# Patient Record
Sex: FEMALE | Race: WHITE
[De-identification: names, ages, dates, MRNs, and addresses within clinical notes are randomized per-mention and may not be internally consistent; named-entity substitution may affect disease eponyms.]

---

## 2019-01-05 ENCOUNTER — HOSPITAL ENCOUNTER (INPATIENT)
Dept: HOSPITAL 75 - 4TH | Age: 74
LOS: 2 days | Discharge: NURSING, REHAB OR LONG-TERM CARE | DRG: 190 | End: 2019-01-07
Attending: FAMILY MEDICINE | Admitting: FAMILY MEDICINE
Payer: MEDICARE

## 2019-01-05 VITALS — DIASTOLIC BLOOD PRESSURE: 51 MMHG | SYSTOLIC BLOOD PRESSURE: 97 MMHG

## 2019-01-05 VITALS — BODY MASS INDEX: 27.13 KG/M2 | HEIGHT: 62 IN | WEIGHT: 147.44 LBS

## 2019-01-05 VITALS — DIASTOLIC BLOOD PRESSURE: 57 MMHG | SYSTOLIC BLOOD PRESSURE: 127 MMHG

## 2019-01-05 DIAGNOSIS — J44.0: Primary | ICD-10-CM

## 2019-01-05 DIAGNOSIS — R09.02: ICD-10-CM

## 2019-01-05 DIAGNOSIS — M19.91: ICD-10-CM

## 2019-01-05 DIAGNOSIS — E78.00: ICD-10-CM

## 2019-01-05 DIAGNOSIS — M34.9: ICD-10-CM

## 2019-01-05 DIAGNOSIS — F03.90: ICD-10-CM

## 2019-01-05 DIAGNOSIS — Z99.81: ICD-10-CM

## 2019-01-05 DIAGNOSIS — K59.09: ICD-10-CM

## 2019-01-05 DIAGNOSIS — J18.1: ICD-10-CM

## 2019-01-05 DIAGNOSIS — I10: ICD-10-CM

## 2019-01-05 DIAGNOSIS — N30.00: ICD-10-CM

## 2019-01-05 DIAGNOSIS — E87.6: ICD-10-CM

## 2019-01-05 PROCEDURE — 84100 ASSAY OF PHOSPHORUS: CPT

## 2019-01-05 PROCEDURE — 87040 BLOOD CULTURE FOR BACTERIA: CPT

## 2019-01-05 PROCEDURE — 83735 ASSAY OF MAGNESIUM: CPT

## 2019-01-05 PROCEDURE — 36415 COLL VENOUS BLD VENIPUNCTURE: CPT

## 2019-01-05 PROCEDURE — 83605 ASSAY OF LACTIC ACID: CPT

## 2019-01-05 PROCEDURE — 83880 ASSAY OF NATRIURETIC PEPTIDE: CPT

## 2019-01-05 PROCEDURE — 71046 X-RAY EXAM CHEST 2 VIEWS: CPT

## 2019-01-05 PROCEDURE — 80053 COMPREHEN METABOLIC PANEL: CPT

## 2019-01-05 PROCEDURE — 94640 AIRWAY INHALATION TREATMENT: CPT

## 2019-01-05 PROCEDURE — 85025 COMPLETE CBC W/AUTO DIFF WBC: CPT

## 2019-01-05 PROCEDURE — 94760 N-INVAS EAR/PLS OXIMETRY 1: CPT

## 2019-01-05 PROCEDURE — 94761 N-INVAS EAR/PLS OXIMETRY MLT: CPT

## 2019-01-05 RX ADMIN — IPRATROPIUM BROMIDE AND ALBUTEROL SULFATE SCH ML: .5; 3 SOLUTION RESPIRATORY (INHALATION) at 22:28

## 2019-01-05 NOTE — NUR
JULIET SEN admitted to room 407-1, with an admitting diagnosis of UTI, PNEUMONIA, on 
01/05/19 from [place]  via [facility], accompanied by EMS STAFF .JULIET SEN 
introduced to surroundings, call light, bed controls, phone, TV, temperature control, 
lights, meal times, smoking policy, visitor policy, side rail policy, bathrooms and showers. 
 Patient Rights given to patient in the handbook.JULIET SEN verbalizes understanding 
that [facility] is not responsible for the loss or damage to any personal effects or 
valuables that are kept in the patients posession during their hospitalization.

## 2019-01-06 VITALS — DIASTOLIC BLOOD PRESSURE: 57 MMHG | SYSTOLIC BLOOD PRESSURE: 112 MMHG

## 2019-01-06 VITALS — DIASTOLIC BLOOD PRESSURE: 57 MMHG | SYSTOLIC BLOOD PRESSURE: 102 MMHG

## 2019-01-06 VITALS — DIASTOLIC BLOOD PRESSURE: 52 MMHG | SYSTOLIC BLOOD PRESSURE: 105 MMHG

## 2019-01-06 VITALS — DIASTOLIC BLOOD PRESSURE: 59 MMHG | SYSTOLIC BLOOD PRESSURE: 114 MMHG

## 2019-01-06 VITALS — DIASTOLIC BLOOD PRESSURE: 56 MMHG | SYSTOLIC BLOOD PRESSURE: 116 MMHG

## 2019-01-06 LAB
ALBUMIN SERPL-MCNC: 3.6 GM/DL (ref 3.2–4.5)
ALP SERPL-CCNC: 156 U/L (ref 40–136)
ALT SERPL-CCNC: 8 U/L (ref 0–55)
BASOPHILS # BLD AUTO: 0 10^3/UL (ref 0–0.1)
BASOPHILS NFR BLD AUTO: 0 % (ref 0–10)
BILIRUB SERPL-MCNC: 0.6 MG/DL (ref 0.1–1)
BUN/CREAT SERPL: 13
CALCIUM SERPL-MCNC: 9.1 MG/DL (ref 8.5–10.1)
CHLORIDE SERPL-SCNC: 105 MMOL/L (ref 98–107)
CO2 SERPL-SCNC: 23 MMOL/L (ref 21–32)
CREAT SERPL-MCNC: 0.77 MG/DL (ref 0.6–1.3)
EOSINOPHIL # BLD AUTO: 0.2 10^3/UL (ref 0–0.3)
EOSINOPHIL NFR BLD AUTO: 3 % (ref 0–10)
ERYTHROCYTE [DISTWIDTH] IN BLOOD BY AUTOMATED COUNT: 13.6 % (ref 10–14.5)
GFR SERPLBLD BASED ON 1.73 SQ M-ARVRAT: > 60 ML/MIN
GLUCOSE SERPL-MCNC: 101 MG/DL (ref 70–105)
HCT VFR BLD CALC: 37 % (ref 35–52)
HGB BLD-MCNC: 11.5 G/DL (ref 11.5–16)
LYMPHOCYTES # BLD AUTO: 1.6 X 10^3 (ref 1–4)
LYMPHOCYTES NFR BLD AUTO: 30 % (ref 12–44)
MAGNESIUM SERPL-MCNC: 1.9 MG/DL (ref 1.8–2.4)
MANUAL DIFFERENTIAL PERFORMED BLD QL: NO
MCH RBC QN AUTO: 30 PG (ref 25–34)
MCHC RBC AUTO-ENTMCNC: 31 G/DL (ref 32–36)
MCV RBC AUTO: 97 FL (ref 80–99)
MONOCYTES # BLD AUTO: 0.7 X 10^3 (ref 0–1)
MONOCYTES NFR BLD AUTO: 14 % (ref 0–12)
NEUTROPHILS # BLD AUTO: 2.7 X 10^3 (ref 1.8–7.8)
NEUTROPHILS NFR BLD AUTO: 52 % (ref 42–75)
PHOSPHATE SERPL-MCNC: 3.9 MG/DL (ref 2.3–4.7)
PLATELET # BLD: 147 10^3/UL (ref 130–400)
PMV BLD AUTO: 10.5 FL (ref 7.4–10.4)
POTASSIUM SERPL-SCNC: 3.3 MMOL/L (ref 3.6–5)
PROT SERPL-MCNC: 6.6 GM/DL (ref 6.4–8.2)
RBC # BLD AUTO: 3.79 10^6/UL (ref 4.35–5.85)
SODIUM SERPL-SCNC: 140 MMOL/L (ref 135–145)
WBC # BLD AUTO: 5.3 10^3/UL (ref 4.3–11)

## 2019-01-06 RX ADMIN — IPRATROPIUM BROMIDE AND ALBUTEROL SULFATE SCH ML: .5; 3 SOLUTION RESPIRATORY (INHALATION) at 19:41

## 2019-01-06 RX ADMIN — SODIUM CHLORIDE SCH MLS/HR: 900 INJECTION INTRAVENOUS at 08:23

## 2019-01-06 RX ADMIN — IPRATROPIUM BROMIDE AND ALBUTEROL SULFATE SCH ML: .5; 3 SOLUTION RESPIRATORY (INHALATION) at 07:07

## 2019-01-06 RX ADMIN — POTASSIUM CHLORIDE SCH MEQ: 1500 TABLET, EXTENDED RELEASE ORAL at 15:54

## 2019-01-06 RX ADMIN — IPRATROPIUM BROMIDE AND ALBUTEROL SULFATE SCH ML: .5; 3 SOLUTION RESPIRATORY (INHALATION) at 10:16

## 2019-01-06 RX ADMIN — DOCUSATE SODIUM AND SENNOSIDES SCH EA: 8.6; 5 TABLET, FILM COATED ORAL at 21:48

## 2019-01-06 RX ADMIN — IPRATROPIUM BROMIDE AND ALBUTEROL SULFATE SCH ML: .5; 3 SOLUTION RESPIRATORY (INHALATION) at 22:04

## 2019-01-06 RX ADMIN — IPRATROPIUM BROMIDE AND ALBUTEROL SULFATE SCH ML: .5; 3 SOLUTION RESPIRATORY (INHALATION) at 02:25

## 2019-01-06 RX ADMIN — IPRATROPIUM BROMIDE AND ALBUTEROL SULFATE SCH ML: .5; 3 SOLUTION RESPIRATORY (INHALATION) at 14:18

## 2019-01-06 RX ADMIN — HYDROCODONE BITARTRATE AND ACETAMINOPHEN PRN TAB: 5; 325 TABLET ORAL at 15:54

## 2019-01-06 NOTE — DIAGNOSTIC IMAGING REPORT
INDICATION: Shortness of air, cough, pneumonia.



TECHNIQUE: Two-view chest at 10:55 a.m.



CORRELATION STUDY: None.



FINDINGS: Heart size is enlarged. Mediastinum is mildly

prominent, may be accentuated by technique and patient

positioning. There are scattered pulmonary parenchymal densities,

right greater than left, most severe involving the lung bases. 

Visualized osseous structures are unremarkable.



IMPRESSION: 

1. Scattered pulmonary parenchymal densities are present. Some of

this may be chronic, findings are suspect for likely more focal

infiltrates about the lung bases. Particularly given no priors

for comparison, short-term followup two-view chest imaging is

recommended for reassessment.

2. Cardiac enlargement without evidence for overt failure.



Dictated by: 



  Dictated on workstation # [id number]

## 2019-01-06 NOTE — HISTORY & PHYSICAL-HOSPITALIST
History of Present Illness


HPI/Chief Complaint


CC: Dyspnea





HPI: This is a 74-year-old white female nursing home patient at [facility] 

in [place] who is the sister of a current patient of mine that has history 

of severe COPD that presented to the [place] ER with hypoxia.  She was 

diagnosed with pneumonia placed on doxycycline 1 day prior worsened to the 

point that they brought her to the ER again and was found to be in need for IV 

antibiotics and transfer for higher level of care.  Dr. Burroughs is seen her in 

consultation and that is appreciated.  Her sister reports that she was on a 

ventilator about one year ago didn't think she would survive but has since been 

struggling with severe COPD.  Chest x-ray revealed bilateral infiltrates 

patient is been maintained on IV antibiotics and workup has been ordered by Dr. Burroughs.  Currently she reports she is less dyspneic since admission and overall 

feels like she is improving.  I reviewed and restarted most of her home 

medications except for immunosuppressants.  Patient was previously on hospice 

and was discharged from that service recently.


Source:  patient, family, caregiver


Exam Limitations:  no limitations


Date Seen


19


Time Seen by a Provider:  11:00


Attending Physician


Teresa Fallon MD


PCP


Jim Vargas MD


Referring Physician





Date of Admission


2019 at 19:20





Home Medications & Allergies


Home Medications


Reviewed patient Home Medication Reconciliation performed by pharmacy 

medication reconciliations technician and/or nursing.


Patients Allergies have been reviewed.





Allergies





Allergies


Coded Allergies


  Tetanus Vaccines and Toxoid (Verified Allergy, Unknown, 19)


  codeine (Verified Allergy, Unknown, 19)


  meperidine (Verified Allergy, Unknown, 19)


  rofecoxib (Verified Allergy, Unknown, 19)


Uncoded Allergies


  NSAIDS ( Allergy, Unknown, 19)








Past Medical-Social-Family Hx


Past Med/Social Hx:  Reviewed Nursing Past Med/Soc Hx, Reviewed and Corrections 

made


Patient Social History


Marrital Status:  single


Employed/Student:  retired


Alcohol Use:  Denies Use


Recreational Drug Use:  No


Smoking Status:  Never a Smoker


Physical Abuse Screen:  No


Sexual Abuse:  No


Recent Foreign Travel:  No


Contact w/other who traveled:  No


Recent Hopitalizations:  No


Recent Infectious Disease Expo:  No





Seasonal Allergies


Seasonal Allergies:  No





Past Medical History


Respiratory:  COPD, Emphysema, Pneumonia


Currently Using CPAP:  No


Currently Using BIPAP:  No


Cardiac:  High Cholesterol, Hypertension


Neurological:  Dementia


Genitourinary:  Bladder Infection


Gastrointestinal:  Chronic Constipation


Musculoskeletal:  Arthritis


Sclererderma


History of Blood Disorders:  No


Adverse Reaction to Blood Castaneda:  No





Review of Systems


Constitutional:  see HPI, malaise, weakness


EENTM:  no symptoms reported


Respiratory:  cough, dyspnea on exertion, short of breath, wheezing


Cardiovascular:  no symptoms reported


Gastrointestinal:  no symptoms reported


Genitourinary:  no symptoms reported


Musculoskeletal:  no symptoms reported


Skin:  no symptoms reported


Psychiatric/Neurological:  No Symptoms Reported


All Other Systems Reviewed


Negative Unless Noted:  Yes





Physical Exam


Physical Exam


Vital Signs





Vital Signs - First Documented








 19





 19:20 20:00 21:27


 


Temp 99.8  


 


Pulse 116  


 


Resp 18  


 


B/P (MAP) 127/57 (80)  


 


Pulse Ox 97  


 


O2 Delivery Room Air  


 


O2 Flow Rate  4.00 


 


FiO2   32





Capillary Refill :


Height, Weight, BMI


Height: 5'2.00"


Weight: 147lbs. 7.0oz. 66.685602pf; 27.0 BMI


Method:


General Appearance:  No Apparent Distress, WD/WN, Chronically ill, Thin


Eyes:  Bilateral Eye Normal Inspection, Bilateral Eye PERRL


HEENT:  PERRL/EOMI, Normal ENT Inspection, Pharynx Normal


Neck:  Full Range of Motion, Normal Inspection, Non Tender, Supple, Carotid 

Bruit


Respiratory:  Chest Non Tender, No Accessory Muscle Use, No Respiratory Distress

, Crackles, Decreased Breath Sounds, Wheezing


Cardiovascular:  Regular Rate, Rhythm, No Edema, No Gallop, No JVD, No Murmur, 

Normal Peripheral Pulses


Gastrointestinal:  Normal Bowel Sounds, No Organomegaly, No Pulsatile Mass, Non 

Tender, Soft


Back:  Normal Inspection, No CVA Tenderness, No Vertebral Tenderness


Extremity:  Normal Capillary Refill, Normal Inspection, Normal Range of Motion, 

Non Tender, No Calf Tenderness, No Pedal Edema


Neurologic/Psychiatric:  Alert, Oriented x3, No Motor/Sensory Deficits, Normal 

Mood/Affect


Skin:  Normal Color, Warm/Dry


Lymphatic:  No Adenopathy





Results


Results/Procedures


Labs


Laboratory Tests


19 05:33








Patient resulted labs reviewed.





Assessment/Plan


Admission Diagnosis


Assessment:


Pneumonia failed doxycycline as an outpatient


History of hospice enrollment just recently discontinued


Severe COPD needs 4 L of oxygen continuously


Scleroderma on immunosuppressives


Dementia


Frail status


History of vent dependency with trach





Plan:


Pneumonia treatment


Oxygen


Nebulizer treatments


Hold immunosuppressive


Restart all home meds


Recheck labs in a.m.


Admission Status:  Inpatient Order (span 2 midnights)


Reason for Inpatient Admission:  


Severe COPD with pneumonia will require 3 days of hospital stay





Diagnosis/Problems


Diagnosis/Problems





(1) Pneumonia


Status:  Acute


Qualifiers:  


   Pneumonia type:  due to unspecified organism  Laterality:  bilateral  Lung 

location:  lower lobe of lung  Qualified Codes:  J18.1 - Lobar pneumonia, 

unspecified organism


(2) UTI (urinary tract infection)


Status:  Acute


Qualifiers:  


   Urinary tract infection type:  acute cystitis  Hematuria presence:  without 

hematuria  Qualified Codes:  N30.00 - Acute cystitis without hematuria


(3) Hypokalemia


Status:  Acute


(4) Frailty


Status:  Chronic


(5) Scleroderma


Status:  Chronic


(6) Immunosuppressed status


Status:  Chronic





Clinical Quality Measures


DVT/VTE Risk/Contraindication:


Risk Factor Score Per Nursin


RFS Level Per Nursing on Admit:  2=Moderate











SRINIVAS SOLORIO DO 2019 12:21

## 2019-01-06 NOTE — NUR
Patient complained of a dull achy feeling on her right back side.  Patient also complained 
of feeling like her bladder is not emptying.  Bladder scan was done.  It showed 30 mls in 
the bladder.  Patient asked for pain medication.  Pain medication given.  Will continue to 
monitor patient.

## 2019-01-06 NOTE — NUR
home oxygen study



pt spo2 dropped while at rest on room air to 79% replaced nasal cannula at 4 lpm and spo2 
increased to 92% pt will require 4 lpm nasal cannula at all times

## 2019-01-06 NOTE — PULMONARY CONSULTATION
History of Present Illness


History of Present Illness


Date of Consultation


1/6/19


 06:16


Time Seen by Provider:  07:37


Date of Admission





History of Present Illness


73yo from ECF with hx of severe oxygen dependent COPD presented as direct admit 

from [place] ED secondary to worsening SOB and hypoxa. She was placed on 

doxy just 1 day prior to admission secondary to pneumonia. Pt required 

ventilator care just 1 year ago. PT has recently been on hospice however was 

dishcarged. I am consulted for pulmonary management.





Allergies and Home Medications


Allergies


Coded Allergies:  


     Tetanus Vaccines and Toxoid (Verified  Allergy, Unknown, 1/5/19)


     codeine (Verified  Allergy, Unknown, 1/5/19)


     meperidine (Verified  Allergy, Unknown, 1/5/19)


     rofecoxib (Verified  Allergy, Unknown, 1/5/19)


Uncoded Allergies:  


     NSAIDS (Allergy, Unknown, 1/5/19)





Home Medications


Acetaminophen 325 Mg Tablet, 325 MG PO Q6H PRN for PAIN-MILD, (Reported)


Albuterol Sulfate 2.5 Mg/0.5 Ml Vial.neb, 2.5 MG INH Q6H, (Reported)


Alprazolam 0.25 Mg Tablet, 0.25 MG PO BID, (Reported)


Amantadine HCl 100 Mg Capsule, 100 MG PO DAILY, (Reported)


Atorvastatin Calcium 10 Mg Tablet, 10 MG PO HS, (Reported)


Calcium Carbonate 200 Mg Tab.chew, 1,000 MG PO Q6H PRN for INDIGESTION, (

Reported)


Doxycycline Hyclate 100 Mg Capsule, 100 MG PO BID, (Reported)


Furosemide 40 Mg Tablet, 40 MG PO DAILY, (Reported)


Gabapentin 100 Mg Capsule, 200 MG PO HS, (Reported)


Hydrocodone/Acetaminophen 1 Each Tablet, 1 EACH PO Q4H PRN for PAIN-MILD TO 

MODERATE, (Reported)


Hydroxychloroquine Sulfate 200 Mg Tablet, 200 MG PO BID, (Reported)


Magnesium Oxide 200 Mg Tablet, 400 MG PO DAILY, (Reported)


Metoprolol Tartrate 25 Mg Tablet, 25 MG PO BID PRN for hypertension, (Reported)


Miconazole/Lanolin/Skin Cln 1 Each Combo..pkg, 1 EACH TP AS NEEDED, (Reported)


Mirtazapine 15 Mg Tablet, 15 MG PO HS PRN for depresson, (Reported)


Mycophenolate Mofetil 500 Mg Tablet, 1,000 MG PO BID, (Reported)


Ondansetron 4 Mg Tab.rapdis, 4 MG PO Q6H PRN for NAUSEA/VOMITING, (Reported)


Polyethylene Glycol 3350 17 Gm Powd.pack, 17 GM PO DAILY PRN for CONSTIPATION-

1ST LINE, (Reported)


Potassium Chloride 20 Meq Tablet.er, 20 MEQ PO BID, (Reported)


Ranitidine HCl 150 Mg Tablet, 150 MG PO BID, (Reported)


Sennosides/Docusate Sodium 1 Each Tablet, 1 EACH PO BID, (Reported)


Tramadol HCl 50 Mg Tablet, 100 MG PO Q6H PRN for PAIN-MILD TO MODERATE, (

Reported)


Trazodone HCl 50 Mg Tablet, 25 MG PO HS, (Reported)


[benylin]  , 30 MG PO Q6H PRN for COUGH, (Reported)


[multivitamin-zinc ]  , 1 TAB PO DAILY, (Reported)





Past Medical-Social-Family Hx


Patient Social History


Alcohol Use:  Denies Use


Recreational Drug Use:  No


Smoking Status:  Never a Smoker


Recent Foreign Travel:  No


Contact w/Someone Who Travel:  No


Recent Infectious Disease Expo:  No


Recent Hopitalizations:  No





Seasonal Allergies


Seasonal Allergies:  No





Past Medical History


Surgeries:  Yes


Respiratory:  Yes


COPD


Currently Using CPAP:  No


Currently Using BIPAP:  No


Cardiac:  No


Neurological:  No


Genitourinary:  No


Gastrointestinal:  No


Musculoskeletal:  No


Endocrine:  No


HEENT:  No


Cancer:  No


Psychosocial:  No


Blood Disorders:  No


Adverse Reaction/Blood Tranf:  No





Review of Systems


Time Seen by Provider:  07:39


Constitutional:  Fever, Chills, Sweats, Weakness


Eyes:  No: Pain, Vision change, Conjunctivae inflammation, Eyelid inflammation, 

Other, Redness


ENT:  Nose congestion; No: Ear pain, Ear discharge, Nose pain, Nose discharge, 

Mouth pain, Mouth swelling, Throat pain, Throat swelling, Other


Respiratory:  Cough, Dry, Shortness of breath, SOB with excertion, Wheezing; No

: Hemoptysis


Cardiovascular:  Palpitations, Paroxysmal Noc. Dyspnea, Lt Headedness; No: 

Chest Pain, Orthopnea, Edema, Other


Gastrointestinal:  No: Nausea, Vomiting, Abdominal Pain, Diarrhea, Constipation

, Melena, Hematochezia, Other


Genitourinary:  No Dysuria, No Frequency, No Incontinence, No Hematuria, No 

Retention, No Other





Sepsis Event


Evaluation


Height, Weight, BMI


Height: 5'2.00"


Weight: 147lbs. 7.0oz. [id number]; 27.0 BMI


Method:





Exam


Exam





Vital Signs








  Date Time  Temp Pulse Resp B/P (MAP) Pulse Ox O2 Delivery O2 Flow Rate FiO2


 


1/6/19 02:27     99 Nasal Cannula 4.00 


 


1/5/19 23:25 98.9 98 16 97/51 (66) 99 Room Air  


 


1/5/19 22:33     98 Nasal Cannula 4.00 


 


1/5/19 22:02      Room Air  


 


1/5/19 21:27  116   97   32


 


1/5/19 20:00     98 Nasal Cannula 4.00 


 


1/5/19 19:20 99.8 116 18 127/57 (80) 97 Room Air  














I & O 


 


 1/6/19





 07:00


 


Intake Total 400 ml


 


Balance 400 ml








Height & Weight


Height: 5'2.00"


Weight: 147lbs. 7.0oz. 66.997852ok; 27.0 BMI


Method:


General Appearance:  No Apparent Distress, WD/WN, Chronically ill


HEENT:  PERRL/EOMI, TMs Normal, Normal ENT Inspection, Pharynx Normal


Neck:  Full Range of Motion, Normal Inspection, Non Tender, Supple


Respiratory:  Chest Non Tender, No Accessory Muscle Use, No Respiratory Distress

, Crackles, Decreased Breath Sounds


Cardiovascular:  Regular Rate, Rhythm, No Edema, No Gallop


Capillary Refill:  Less Than 3 Seconds


Gastrointestinal:  normal bowel sounds, non tender, soft


Extremity:  Normal Capillary Refill, Normal Inspection


Neurologic/Psychiatric:  Alert, Oriented x3


Skin:  Normal Color, Warm/Dry


Lymphatic:  No Adenopathy





Assessment/Plan


Assessment/Plan


Pneumonia with hypoxia  -- TM 99.8 - failed out patient treatment 


   -Oxygen currently 4 liters NC


   -Currently on Rocpehin 


   -Labs and CXR pending 


   -pan cultures 


Severe oxygen dependent COPD


   -SVNs 


Scleroderma on immunosuppressives (on Hold currently) 


Dementia


History of vent dependency with trach


Hx of being on hospice











GORGE KEYES DO Jan 6, 2019 06:21

## 2019-01-07 VITALS — DIASTOLIC BLOOD PRESSURE: 53 MMHG | SYSTOLIC BLOOD PRESSURE: 95 MMHG

## 2019-01-07 VITALS — DIASTOLIC BLOOD PRESSURE: 55 MMHG | SYSTOLIC BLOOD PRESSURE: 103 MMHG

## 2019-01-07 VITALS — SYSTOLIC BLOOD PRESSURE: 112 MMHG | DIASTOLIC BLOOD PRESSURE: 61 MMHG

## 2019-01-07 VITALS — DIASTOLIC BLOOD PRESSURE: 57 MMHG | SYSTOLIC BLOOD PRESSURE: 118 MMHG

## 2019-01-07 LAB
ALBUMIN SERPL-MCNC: 3.5 GM/DL (ref 3.2–4.5)
ALP SERPL-CCNC: 146 U/L (ref 40–136)
ALT SERPL-CCNC: 6 U/L (ref 0–55)
BASOPHILS # BLD AUTO: 0 10^3/UL (ref 0–0.1)
BASOPHILS NFR BLD AUTO: 0 % (ref 0–10)
BILIRUB SERPL-MCNC: 0.5 MG/DL (ref 0.1–1)
BUN/CREAT SERPL: 14
CALCIUM SERPL-MCNC: 9 MG/DL (ref 8.5–10.1)
CHLORIDE SERPL-SCNC: 106 MMOL/L (ref 98–107)
CO2 SERPL-SCNC: 24 MMOL/L (ref 21–32)
CREAT SERPL-MCNC: 0.7 MG/DL (ref 0.6–1.3)
EOSINOPHIL # BLD AUTO: 0.2 10^3/UL (ref 0–0.3)
EOSINOPHIL NFR BLD AUTO: 4 % (ref 0–10)
ERYTHROCYTE [DISTWIDTH] IN BLOOD BY AUTOMATED COUNT: 13.5 % (ref 10–14.5)
GFR SERPLBLD BASED ON 1.73 SQ M-ARVRAT: > 60 ML/MIN
GLUCOSE SERPL-MCNC: 97 MG/DL (ref 70–105)
HCT VFR BLD CALC: 35 % (ref 35–52)
HGB BLD-MCNC: 10.8 G/DL (ref 11.5–16)
LYMPHOCYTES # BLD AUTO: 1.6 X 10^3 (ref 1–4)
LYMPHOCYTES NFR BLD AUTO: 31 % (ref 12–44)
MANUAL DIFFERENTIAL PERFORMED BLD QL: NO
MCH RBC QN AUTO: 30 PG (ref 25–34)
MCHC RBC AUTO-ENTMCNC: 31 G/DL (ref 32–36)
MCV RBC AUTO: 97 FL (ref 80–99)
MONOCYTES # BLD AUTO: 0.6 X 10^3 (ref 0–1)
MONOCYTES NFR BLD AUTO: 12 % (ref 0–12)
NEUTROPHILS # BLD AUTO: 2.6 X 10^3 (ref 1.8–7.8)
NEUTROPHILS NFR BLD AUTO: 53 % (ref 42–75)
PLATELET # BLD: 159 10^3/UL (ref 130–400)
PMV BLD AUTO: 10.4 FL (ref 7.4–10.4)
POTASSIUM SERPL-SCNC: 4 MMOL/L (ref 3.6–5)
PROT SERPL-MCNC: 6.9 GM/DL (ref 6.4–8.2)
RBC # BLD AUTO: 3.63 10^6/UL (ref 4.35–5.85)
SODIUM SERPL-SCNC: 139 MMOL/L (ref 135–145)
WBC # BLD AUTO: 5 10^3/UL (ref 4.3–11)

## 2019-01-07 RX ADMIN — HYDROCODONE BITARTRATE AND ACETAMINOPHEN PRN TAB: 5; 325 TABLET ORAL at 08:22

## 2019-01-07 RX ADMIN — IPRATROPIUM BROMIDE AND ALBUTEROL SULFATE SCH ML: .5; 3 SOLUTION RESPIRATORY (INHALATION) at 06:36

## 2019-01-07 RX ADMIN — SODIUM CHLORIDE SCH MLS/HR: 900 INJECTION INTRAVENOUS at 08:03

## 2019-01-07 RX ADMIN — HYDROCODONE BITARTRATE AND ACETAMINOPHEN PRN TAB: 5; 325 TABLET ORAL at 01:47

## 2019-01-07 RX ADMIN — IPRATROPIUM BROMIDE AND ALBUTEROL SULFATE SCH ML: .5; 3 SOLUTION RESPIRATORY (INHALATION) at 10:42

## 2019-01-07 RX ADMIN — IPRATROPIUM BROMIDE AND ALBUTEROL SULFATE SCH ML: .5; 3 SOLUTION RESPIRATORY (INHALATION) at 02:35

## 2019-01-07 RX ADMIN — DOCUSATE SODIUM AND SENNOSIDES SCH EA: 8.6; 5 TABLET, FILM COATED ORAL at 08:04

## 2019-01-07 RX ADMIN — POTASSIUM CHLORIDE SCH MEQ: 1500 TABLET, EXTENDED RELEASE ORAL at 07:42

## 2019-01-07 NOTE — DIAGNOSTIC IMAGING REPORT
INDICATION: Pneumonia.



PA and lateral chest obtained at 10:27 a.m. is compared to

yesterday.



FINDINGS: There is very poor inspiration again noted. Bilateral

infiltrates appears stable and unchanged compared to the prior

study. There is cardiomegaly. There is no pneumothorax or gross

pleural fluid.



IMPRESSION:



Cardiomegaly with very poor inspiration. There is central

vascular congestion with unchanged bilateral infiltrates compared

to yesterday.



Dictated by: 



  Dictated on workstation # [id number]

## 2019-01-07 NOTE — NUR
CM/SS spoke with Anisha Chowdhury ([facility]) they received the discharge 
information that was faxed. They will send transportation this way for  within the 
hour. RNing and patient updated.

## 2019-01-07 NOTE — NUR
UNABLE TO UPDATE MED REC PRIOR TO DISCHARGE BEING FINALIZED. PUT THE LIST OF MEDICATIONS 
FROM [facility] ON THE CHART AT THIS TIME.

## 2019-01-07 NOTE — NUR
CM/SS Sameer Suazo [facility] were notified that the patient was ready for return to their 
facility this day. Left a message with Anisha Chowdhury for a transport time.

## 2019-01-07 NOTE — PULMONARY PROGRESS NOTE
Subjective


Time Seen by a Provider:  07:42





Sepsis Event


Evaluation


Height, Weight, BMI


Height: 5'2.00"


Weight: 147lbs. 7.0oz. 66.138099ok; 27.0 BMI


Method:





Focused Exam


Lactate Level


1/6/19 07:05: Lactic Acid Level 1.01





Exam


Exam





Vital Signs








  Date Time  Temp Pulse Resp B/P (MAP) Pulse Ox O2 Delivery O2 Flow Rate FiO2


 


1/7/19 06:38     98 Nasal Cannula 3.00 


 


1/7/19 03:44 97.5 88 16 103/55 (71) 99 Nasal Cannula 4.00 


 


1/7/19 02:35     98 Nasal Cannula 3.00 


 


1/7/19 00:09 97.7 96 16 112/61 (78) 98 Nasal Cannula 4.00 


 


1/6/19 22:04     98 Nasal Cannula 3.00 


 


1/6/19 20:00     99 Nasal Cannula 4.00 


 


1/6/19 19:49 97.7 91 18 102/57 (72) 100 Nasal Cannula 3.00 


 


1/6/19 19:41     98 Nasal Cannula 4.00 


 


1/6/19 15:32 98.0 107 18 116/56 (76) 100 Nasal Cannula 4.00 


 


1/6/19 14:19     95 Nasal Cannula 4.00 


 


1/6/19 13:16     92  4.00 


 


1/6/19 12:00 98.2 100 22 105/52 (69) 100 Nasal Cannula 4.00 


 


1/6/19 10:16     95 Nasal Cannula 4.00 


 


1/6/19 08:00 99.3 105 28 114/59 (77) 99 Nasal Cannula 4.00 


 


1/6/19 08:00     99 Nasal Cannula 4.00 














I & O 


 


 1/7/19





 07:00


 


Intake Total 1550 ml


 


Balance 1550 ml








Height & Weight


Height: 5'2.00"


Weight: 147lbs. 7.0oz. 66.719095je; 27.0 BMI


Method:


General Appearance:  No Apparent Distress, WD/WN, Chronically ill


HEENT:  PERRL/EOMI, TMs Normal, Normal ENT Inspection, Pharynx Normal


Neck:  Full Range of Motion, Normal Inspection, Non Tender, Supple


Respiratory:  Chest Non Tender, No Accessory Muscle Use, No Respiratory Distress

, Crackles, Decreased Breath Sounds


Cardiovascular:  Regular Rate, Rhythm, No Edema, No Gallop


Capillary Refill:  Less Than 3 Seconds


Gastrointestinal:  normal bowel sounds, non tender, soft


Extremity:  Normal Capillary Refill, Normal Inspection


Neurologic/Psychiatric:  Alert, Oriented x3


Skin:  Normal Color, Warm/Dry


Lymphatic:  No Adenopathy





Results


Lab


Laboratory Tests


1/6/19 05:33








1/7/19 06:00











Assessment/Plan


Assessment/Plan


Pneumonia with hypoxia  --  failed out patient treatment 


   -No fever or leukocytosis


   -Oxygen currently 4 liters NC


   -Currently on Rocephin 


   -Labs and CXR reviewed 


   -pan cultures pending 


Severe oxygen dependent COPD


   -SVNs 


Scleroderma on immunosuppressives (on Hold currently) 


Dementia


History of vent dependency with trach


Hx of being on hospice











GORGE KEYES DO Jan 7, 2019 07:42

## 2019-02-22 ENCOUNTER — HOSPITAL ENCOUNTER (EMERGENCY)
Dept: HOSPITAL 75 - ER FS | Age: 74
Discharge: HOME | End: 2019-02-22
Payer: MEDICARE

## 2019-02-22 VITALS — HEIGHT: 63 IN | BODY MASS INDEX: 26.07 KG/M2 | WEIGHT: 147.13 LBS

## 2019-02-22 VITALS — SYSTOLIC BLOOD PRESSURE: 119 MMHG | DIASTOLIC BLOOD PRESSURE: 59 MMHG

## 2019-02-22 DIAGNOSIS — J84.10: Primary | ICD-10-CM

## 2019-02-22 DIAGNOSIS — Z99.81: ICD-10-CM

## 2019-02-22 DIAGNOSIS — Z88.8: ICD-10-CM

## 2019-02-22 DIAGNOSIS — Z79.51: ICD-10-CM

## 2019-02-22 DIAGNOSIS — Z88.6: ICD-10-CM

## 2019-02-22 DIAGNOSIS — Z87.448: ICD-10-CM

## 2019-02-22 DIAGNOSIS — Z88.7: ICD-10-CM

## 2019-02-22 DIAGNOSIS — E78.00: ICD-10-CM

## 2019-02-22 DIAGNOSIS — F03.90: ICD-10-CM

## 2019-02-22 DIAGNOSIS — Z87.19: ICD-10-CM

## 2019-02-22 DIAGNOSIS — Z79.52: ICD-10-CM

## 2019-02-22 DIAGNOSIS — J44.9: ICD-10-CM

## 2019-02-22 DIAGNOSIS — Z88.5: ICD-10-CM

## 2019-02-22 DIAGNOSIS — I10: ICD-10-CM

## 2019-02-22 PROCEDURE — 71045 X-RAY EXAM CHEST 1 VIEW: CPT

## 2019-02-22 PROCEDURE — 93005 ELECTROCARDIOGRAM TRACING: CPT

## 2019-02-22 NOTE — DIAGNOSTIC IMAGING REPORT
INDICATION: Shortness breath



Portable chest 3:33 AM



There are diffuse interstitial infiltrates in the lungs. These

appear similar to the comparison study dated 01/07/2019. There is

no effusion or pneumothorax. Heart size and pulmonary vascularity

both mildly increased but stable.



IMPRESSION: Chronic pulmonary venous hypertension. Diffuse

interstitial fibrosis. No change since 01/07/2019.



Dictated by: 



  Dictated on workstation # [id number]

## 2019-02-22 NOTE — ED RESPIRATORY
General


Chief Complaint:  Respiratory Problems


Stated Complaint:  SOB





History of Present Illness


Date Seen by Provider:  Feb 22, 2019


Time Seen by Provider:  03:20


Initial Comments


The patient is a pleasant 74-year-old female from nursing home here for 

evaluation shortness of breath. She tells me that she was feeling short of 

breath and was given 2 breathing treatments at the nursing home which 

completely resolved her symptoms. She then tells us that she did not want to be 

transported to the emergency department but was told that she was being 

transported anyway. She reports a history of pulmonary fibrosis and is on 

oxygen continuously. She is alert and oriented 3, calm, and appears to be in 

no distress. She does not want us to start an IV or do any blood testing but is 

okay with an EKG and a chest x-ray.


Timing/Duration:  this morning


Severity:  mild


Prior Episodes/Possible Cause:  chronic episodes


Modifying Factors:  Improves With Albuterol Nebulizer


Associated Symptoms:  shortness of breath





Allergies and Home Medications


Allergies


Coded Allergies:  


     Tetanus Vaccines and Toxoid (Verified  Allergy, Unknown, 2/22/19)


     codeine (Verified  Allergy, Unknown, 2/22/19)


     meperidine (Verified  Allergy, Unknown, 2/22/19)


     rofecoxib (Verified  Allergy, Unknown, 2/22/19)


Uncoded Allergies:  


     NSAIDS (Allergy, Unknown, 1/5/19)





Home Medications


Acetaminophen 325 Mg Tablet, 325 MG PO Q6H PRN for PAIN-MILD, (Reported)


Albuterol Sulfate 2.5 Mg/0.5 Ml Vial.neb, 2.5 MG INH Q6H, (Reported)


Alprazolam 0.25 Mg Tablet, 0.25 MG PO BID, (Reported)


Amantadine HCl 100 Mg Capsule, 100 MG PO DAILY, (Reported)


Atorvastatin Calcium 10 Mg Tablet, 10 MG PO HS, (Reported)


Calcium Carbonate 200 Mg Tab.chew, 1,000 MG PO Q6H PRN for INDIGESTION, (

Reported)


Cefdinir 300 Mg Capsule, 300 MG PO BID


   Prescribed by: SRINIVAS SOLORIO on 1/7/19 0940


Furosemide 40 Mg Tablet, 40 MG PO DAILY, (Reported)


Gabapentin 100 Mg Capsule, 200 MG PO HS, (Reported)


Hydrocodone/Acetaminophen 1 Each Tablet, 1 EACH PO Q4H PRN for PAIN-MILD TO 

MODERATE, (Reported)


Magnesium Oxide 200 Mg Tablet, 400 MG PO DAILY, (Reported)


Metoprolol Tartrate 25 Mg Tablet, 25 MG PO BID PRN for hypertension, (Reported)


Miconazole/Lanolin/Skin Cln 1 Each Combo..pkg, 1 EACH TP AS NEEDED, (Reported)


Mirtazapine 15 Mg Tablet, 15 MG PO HS PRN for depresson, (Reported)


Mycophenolate Mofetil 500 Mg Tablet, 1,000 MG PO BID, (Reported)


Ondansetron 4 Mg Tab.rapdis, 4 MG PO Q6H PRN for NAUSEA/VOMITING, (Reported)


Polyethylene Glycol 3350 17 Gm Powd.pack, 17 GM PO DAILY PRN for CONSTIPATION-

1ST LINE, (Reported)


Potassium Chloride 20 Meq Tablet.er, 20 MEQ PO BID, (Reported)


Prednisone 20 Mg Tab, 40 MG PO DAILY


   Prescribed by: EL ARROYO on 2/22/19 0342


Ranitidine HCl 150 Mg Tablet, 150 MG PO BID, (Reported)


Sennosides/Docusate Sodium 1 Each Tablet, 1 EACH PO BID, (Reported)


Tramadol HCl 50 Mg Tablet, 100 MG PO Q6H PRN for PAIN-MILD TO MODERATE, (

Reported)


Trazodone HCl 50 Mg Tablet, 25 MG PO HS, (Reported)


[benylin]  , 30 MG PO Q6H PRN for COUGH, (Reported)


[multivitamin-zinc ]  , 1 TAB PO DAILY, (Reported)





Patient Home Medication List


Home Medication List Reviewed:  Yes





Review of Systems


Review of Systems


Constitutional:  no symptoms reported


EENTM:  no symptoms reported


Respiratory:  short of breath


Cardiovascular:  no symptoms reported


Gastrointestinal:  no symptoms reported


Genitourinary:  no symptoms reported


Musculoskeletal:  no symptoms reported


Skin:  no symptoms reported


Psychiatric/Neurological:  No Symptoms Reported


Hematologic/Lymphatic:  No Symptoms Reported


Immunological/Allergic:  no symptoms reported





All Other Systems Reviewed


Negative Unless Noted:  Yes





Past Medical-Social-Family Hx


Patient Social History


Recent Hopitalizations:  No





Immunizations Up To Date


Date of Pneumonia Vaccine:  Oct 15, 2018


Date of Influenza Vaccine:  Oct 15, 2018





Seasonal Allergies


Seasonal Allergies:  No





Past Medical History


Surgeries:  Yes


Respiratory:  Yes


COPD


Currently Using CPAP:  No


Currently Using BIPAP:  No


Cardiac:  No


High Cholesterol, Hypertension


Neurological:  No


Dementia


Genitourinary:  No


Bladder Infection


Gastrointestinal:  No


Chronic Constipation


Musculoskeletal:  No


Arthritis


Endocrine:  No


HEENT:  No


Cancer:  No


Psychosocial:  No


Blood Disorders:  No


Adverse Reaction/Blood Tranf:  No





Physical Exam


Capillary Refill :


Height: 5'2.00"


Weight: 147lbs. 7.0oz. [id number]; 27.0 BMI


Method:


General Appearance:  WD/WN, no apparent distress


HEENT:  PERRL/EOMI, normal ENT inspection, pharynx normal


Neck:  non-tender, full range of motion, supple, normal inspection


Respiratory:  chest non-tender, lungs clear, no accessory muscle use; No 

respiratory distress; decreased breath sounds; No crackles, No rales, No rhonchi

, No stridor, No wheezing


Cardiovascular:  regular rate, rhythm; No no edema (1+ b/l edema (pt states 

chronic and at baseline)); no JVD, no murmur, tachycardia (Pt received 

nebulized breathing tx shortly before arrival)


Gastrointestinal:  normal bowel sounds, non tender, soft


Extremities:  normal range of motion, non-tender, normal inspection, no pedal 

edema, no calf tenderness


Neurologic/Psychiatric:  no motor/sensory deficits, alert, normal mood/affect, 

oriented x 3


Skin:  normal color, warm/dry





Progress/Results/Core Measures


Suspected Sepsis


SIRS


Temperature: 


Pulse:  


Respiratory Rate: 


 


Blood Pressure  / 


Mean:





Results/Orders


My Orders





Orders - ISMAEL GALLEGOS DO


Ekg Tracing (2/22/19 03:29)


Chest 1 View Ap/Pa Only (2/22/19 03:29)


Prednisone Tablet (Deltasone Tablet) (2/22/19 03:30)





Medications Given in ED





Current Medications








 Medications  Dose


 Ordered  Sig/María


 Route  Start Time


 Stop Time Status Last Admin


Dose Admin


 


 Prednisone  50 mg  ONCE  ONCE


 PO  2/22/19 03:30


 2/22/19 03:31 DC 2/22/19 03:48


50 MG








Vital Signs/I&O


Capillary Refill :


Progress Note :  


   Time:  03:59


Progress Note


@0359 - The patient's EKG shows sinus tachycardia which is expected given the 

recent breathing treatment. Otherwise are no concerning findings noted. Her 

chest x-ray shows chronic bilateral pulmonary fibrotic changes as expected. 

When compared to previous chest x-ray on 1/6/19 and 1/7/19 there is slight 

improvement in chronic appearing bilateral infiltrates. I explained the patient 

that we could do some blood testing to further evaluate her symptoms however 

she declined stating that she wants to go back to the nursing home and is 

feeling fine and feels that she is breathing normally. She will go home( to her 

nursing home) with a prescription for prednisone. She has been given some 

prednisone here in the emergency department. Advised the patient to follow up 

with her primary care physician in the next 1-2 days. I advised the patient to 

return to the emergency department immediately for any new or worsening 

symptoms. She expresses verbal understanding and is stable for discharge at 

this time.





ECG


Initial ECG Impression Date:  Feb 22, 2019


Initial ECG Impression Time:  03:33


Initial ECG Rate:  116


Initial ECG Rhythm:  S.Tach


Initial ECG Intervals:  Normal


Initial ECG Impression:  Nonspecific Changes


Comment


Sinus tachycardia, rate of 116, normal axis, no acute ischemic findings noted, 

no STEMI, reviewed and interpreted by myself





Diagnostic Imaging





   Diagonstic Imaging:  Xray


   Plain Films/CT/US/NM/MRI:  chest


Comments


Preliminary CXR - bilateral fibrotic changes/chronic-appearing infiltrates 

which appear slightly improved from chest x-ray 6 weeks ago, no consolidating 

infiltrate or mass is noted





Departure


Impression





 Primary Impression:  


 Dyspnea


 Additional Impression:  


 Chronic fibrosis of lung


Disposition:  01 HOME, SELF-CARE (back to nursing home)


Condition:  Stable





Departure-Patient Inst.


Referrals:  


DEMETRIUS CABRALES MD (PCP/Family)


Primary Care Physician


Patient Instructions:  Shortness of Breath (Dyspnea)





Add. Discharge Instructions:  


All discharge instructions reviewed with patient and/or family. Voiced 

understanding. Typically prescribed steroids as directed.


Scripts


Prednisone (Prednisone) 20 Mg Tab


40 MG PO DAILY for 5 Days, #10 TAB 0 Refills


   Prov: ISMAEL GALLEGOS DO         2/22/19











ISMAEL GALLEGOS DO Feb 22, 2019 03:36

## 2019-04-08 ENCOUNTER — HOSPITAL ENCOUNTER (OUTPATIENT)
Dept: HOSPITAL 75 - LAB FS | Age: 74
End: 2019-04-08
Attending: FAMILY MEDICINE
Payer: MEDICARE

## 2019-04-08 DIAGNOSIS — R82.998: Primary | ICD-10-CM

## 2019-04-08 LAB
APTT PPP: YELLOW S
BACTERIA #/AREA URNS HPF: (no result) /HPF
BILIRUB UR QL STRIP: NEGATIVE
FIBRINOGEN PPP-MCNC: (no result) MG/DL
GLUCOSE UR STRIP-MCNC: NEGATIVE MG/DL
KETONES UR QL STRIP: NEGATIVE
LEUKOCYTE ESTERASE UR QL STRIP: (no result)
NITRITE UR QL STRIP: POSITIVE
PH UR STRIP: 6.5 [PH] (ref 5–9)
PROT UR QL STRIP: (no result)
RBC #/AREA URNS HPF: (no result) /HPF
SP GR UR STRIP: 1.02 (ref 1.02–1.02)
UROBILINOGEN UR-MCNC: 0.2 MG/DL
WBC #/AREA URNS HPF: >100 /HPF

## 2019-04-08 PROCEDURE — 87077 CULTURE AEROBIC IDENTIFY: CPT

## 2019-04-08 PROCEDURE — 81000 URINALYSIS NONAUTO W/SCOPE: CPT

## 2019-04-08 PROCEDURE — 87088 URINE BACTERIA CULTURE: CPT

## 2019-06-05 ENCOUNTER — HOSPITAL ENCOUNTER (OUTPATIENT)
Dept: HOSPITAL 75 - RAD FS | Age: 74
End: 2019-06-05
Attending: FAMILY MEDICINE
Payer: MEDICARE

## 2019-06-05 DIAGNOSIS — R10.84: Primary | ICD-10-CM

## 2019-06-05 DIAGNOSIS — Z95.828: ICD-10-CM

## 2019-06-05 PROCEDURE — 74019 RADEX ABDOMEN 2 VIEWS: CPT

## 2019-06-05 NOTE — DIAGNOSTIC IMAGING REPORT
INDICATION: 

Abdominal pain.



TIME OF EXAMINATION:   

8:35 AM.



FINDINGS:

An IVC filter is in place. The bowel gas pattern is unremarkable

and appears nonobstructed. There are surgical clips in the right

upper quadrant. No pathologic calcifications are seen. No free

air is identified.



IMPRESSION: 

No acute feature is detected.



Dictated by: 



  Dictated on workstation # [id number]

## 2019-07-10 ENCOUNTER — HOSPITAL ENCOUNTER (EMERGENCY)
Dept: HOSPITAL 75 - ER FS | Age: 74
Discharge: HOME | End: 2019-07-10
Payer: MEDICARE

## 2019-07-10 VITALS — DIASTOLIC BLOOD PRESSURE: 65 MMHG | SYSTOLIC BLOOD PRESSURE: 119 MMHG

## 2019-07-10 VITALS — SYSTOLIC BLOOD PRESSURE: 120 MMHG | DIASTOLIC BLOOD PRESSURE: 63 MMHG

## 2019-07-10 VITALS — WEIGHT: 145 LBS | BODY MASS INDEX: 26.68 KG/M2 | HEIGHT: 62 IN

## 2019-07-10 DIAGNOSIS — J96.10: Primary | ICD-10-CM

## 2019-07-10 DIAGNOSIS — I10: ICD-10-CM

## 2019-07-10 DIAGNOSIS — J44.9: ICD-10-CM

## 2019-07-10 DIAGNOSIS — F03.90: ICD-10-CM

## 2019-07-10 DIAGNOSIS — J84.9: ICD-10-CM

## 2019-07-10 DIAGNOSIS — I25.2: ICD-10-CM

## 2019-07-10 DIAGNOSIS — Z99.81: ICD-10-CM

## 2019-07-10 DIAGNOSIS — Z88.5: ICD-10-CM

## 2019-07-10 DIAGNOSIS — Z88.7: ICD-10-CM

## 2019-07-10 DIAGNOSIS — Z88.6: ICD-10-CM

## 2019-07-10 DIAGNOSIS — E11.9: ICD-10-CM

## 2019-07-10 DIAGNOSIS — Z88.8: ICD-10-CM

## 2019-07-10 LAB
ALBUMIN SERPL-MCNC: 3.8 GM/DL (ref 3.2–4.5)
ALP SERPL-CCNC: 181 U/L (ref 40–136)
ALT SERPL-CCNC: 6 U/L (ref 0–55)
APTT BLD: 29 SEC (ref 24–35)
APTT PPP: (no result) S
ARTERIAL PATENCY WRIST A: (no result)
BACTERIA #/AREA URNS HPF: (no result) /HPF
BASE EXCESS STD BLDA CALC-SCNC: 6.7 MMOL/L (ref -2.5–2.5)
BASOPHILS # BLD AUTO: 0 10^3/UL (ref 0–0.1)
BASOPHILS NFR BLD AUTO: 1 % (ref 0–10)
BDY SITE: (no result)
BILIRUB SERPL-MCNC: 0.3 MG/DL (ref 0.1–1)
BILIRUB UR QL STRIP: NEGATIVE
BODY TEMPERATURE: 98.2
BUN/CREAT SERPL: 11
CALCIUM SERPL-MCNC: 8.6 MG/DL (ref 8.5–10.1)
CHLORIDE SERPL-SCNC: 101 MMOL/L (ref 98–107)
CO2 BLDA CALC-SCNC: 33.3 MMOL/L (ref 21–31)
CO2 SERPL-SCNC: 28 MMOL/L (ref 21–32)
CREAT SERPL-MCNC: 0.65 MG/DL (ref 0.6–1.3)
EOSINOPHIL # BLD AUTO: 0.2 10^3/UL (ref 0–0.3)
EOSINOPHIL NFR BLD AUTO: 4 % (ref 0–10)
ERYTHROCYTE [DISTWIDTH] IN BLOOD BY AUTOMATED COUNT: 13 % (ref 10–14.5)
FIBRINOGEN PPP-MCNC: CLEAR MG/DL
GFR SERPLBLD BASED ON 1.73 SQ M-ARVRAT: > 60 ML/MIN
GLUCOSE SERPL-MCNC: 124 MG/DL (ref 70–105)
GLUCOSE UR STRIP-MCNC: NEGATIVE MG/DL
HCT VFR BLD CALC: 37 % (ref 35–52)
HGB BLD-MCNC: 11.7 G/DL (ref 11.5–16)
INHALED O2 FLOW RATE: (no result) L/MIN
INR PPP: 1.2 (ref 0.8–1.4)
KETONES UR QL STRIP: NEGATIVE
LEUKOCYTE ESTERASE UR QL STRIP: (no result)
LYMPHOCYTES # BLD AUTO: 1.6 X 10^3 (ref 1–4)
LYMPHOCYTES NFR BLD AUTO: 26 % (ref 12–44)
MANUAL DIFFERENTIAL PERFORMED BLD QL: NO
MCH RBC QN AUTO: 31 PG (ref 25–34)
MCHC RBC AUTO-ENTMCNC: 31 G/DL (ref 32–36)
MCV RBC AUTO: 97 FL (ref 80–99)
MONOCYTES # BLD AUTO: 0.5 X 10^3 (ref 0–1)
MONOCYTES NFR BLD AUTO: 8 % (ref 0–12)
NEUTROPHILS # BLD AUTO: 3.6 X 10^3 (ref 1.8–7.8)
NEUTROPHILS NFR BLD AUTO: 61 % (ref 42–75)
NITRITE UR QL STRIP: NEGATIVE
PCO2 BLDA: 47 MMHG (ref 35–45)
PH BLDA: 7.44 [PH] (ref 7.37–7.43)
PH UR STRIP: 6.5 [PH] (ref 5–9)
PLATELET # BLD: 161 10^3/UL (ref 130–400)
PMV BLD AUTO: 10.8 FL (ref 7.4–10.4)
PO2 BLDA: 127 MMHG (ref 79–93)
POTASSIUM SERPL-SCNC: 3.1 MMOL/L (ref 3.6–5)
PROT SERPL-MCNC: 7.1 GM/DL (ref 6.4–8.2)
PROT UR QL STRIP: NEGATIVE
PROTHROMBIN TIME: 15.2 SEC (ref 12.2–14.7)
RBC #/AREA URNS HPF: (no result) /HPF
SAO2 % BLDA FROM PO2: 99 % (ref 94–100)
SODIUM SERPL-SCNC: 143 MMOL/L (ref 135–145)
SP GR UR STRIP: <=1.005 (ref 1.02–1.02)
SQUAMOUS #/AREA URNS HPF: (no result) /HPF
UROBILINOGEN UR-MCNC: 0.2 MG/DL
VENTILATION MODE VENT: NO
WBC # BLD AUTO: 5.9 10^3/UL (ref 4.3–11)

## 2019-07-10 PROCEDURE — 96361 HYDRATE IV INFUSION ADD-ON: CPT

## 2019-07-10 PROCEDURE — 36415 COLL VENOUS BLD VENIPUNCTURE: CPT

## 2019-07-10 PROCEDURE — 71045 X-RAY EXAM CHEST 1 VIEW: CPT

## 2019-07-10 PROCEDURE — 83605 ASSAY OF LACTIC ACID: CPT

## 2019-07-10 PROCEDURE — 93005 ELECTROCARDIOGRAM TRACING: CPT

## 2019-07-10 PROCEDURE — 85730 THROMBOPLASTIN TIME PARTIAL: CPT

## 2019-07-10 PROCEDURE — 87077 CULTURE AEROBIC IDENTIFY: CPT

## 2019-07-10 PROCEDURE — 85025 COMPLETE CBC W/AUTO DIFF WBC: CPT

## 2019-07-10 PROCEDURE — 87186 SC STD MICRODIL/AGAR DIL: CPT

## 2019-07-10 PROCEDURE — 87040 BLOOD CULTURE FOR BACTERIA: CPT

## 2019-07-10 PROCEDURE — 85610 PROTHROMBIN TIME: CPT

## 2019-07-10 PROCEDURE — 96374 THER/PROPH/DIAG INJ IV PUSH: CPT

## 2019-07-10 PROCEDURE — 80053 COMPREHEN METABOLIC PANEL: CPT

## 2019-07-10 PROCEDURE — 96375 TX/PRO/DX INJ NEW DRUG ADDON: CPT

## 2019-07-10 PROCEDURE — 83880 ASSAY OF NATRIURETIC PEPTIDE: CPT

## 2019-07-10 PROCEDURE — 87088 URINE BACTERIA CULTURE: CPT

## 2019-07-10 PROCEDURE — 82805 BLOOD GASES W/O2 SATURATION: CPT

## 2019-07-10 PROCEDURE — 81000 URINALYSIS NONAUTO W/SCOPE: CPT

## 2019-07-10 NOTE — ED RESPIRATORY
General


Chief Complaint:  Respiratory Problems


Stated Complaint:  SOB


Source:  patient, EMS, nursing home records


Exam Limitations:  physical impairment (hard of hearing)


 (JAMES JOINER)





History of Present Illness


Date Seen by Provider:  Jul 10, 2019


Time Seen by Provider:  05:03


Initial Comments


The patient presents to the ER by EMS from the nursing home with chief complaint

of a fever, cough, mild shortness of breath. She was on 4 L baseline nasal can

nula when EMS arrived. She did not have a fever for EMS with a MAXIMUM 

TEMPERATURE over 100 per nursing home staff. No mention of antipyretics. She 

was having a wheeze so EMS gave her a DuoNeb on route which helped. Nursing home

staff reported they oxygen saturation in the 60s but the patient was alert and 

talking and EMS reports the oxygen probe was on the fingernail with some finger 

now paint on it. When they put their probe on a different finger they got in the

mid to low 90s. Patient did have subjective complaint of shortness of breath and

99.4 temperature.


No mention of COPD or asthma. The patient has scleroderma and diabetes on oxygen

with DuoNeb but no anticholinergics her inhaled corticosteroids. She is on 

CellCept.


 (JAMES JOINER)





Allergies and Home Medications


Allergies


Coded Allergies:  


     Tetanus Vaccines and Toxoid (Verified  Allergy, Unknown, 2/22/19)


     codeine (Verified  Allergy, Unknown, 2/22/19)


     meperidine (Verified  Allergy, Unknown, 2/22/19)


     rofecoxib (Verified  Allergy, Unknown, 2/22/19)


Uncoded Allergies:  


     NSAIDS (Allergy, Unknown, 1/5/19)





Home Medications


Acetaminophen 325 Mg Tablet, 325 MG PO Q6H PRN for PAIN-MILD, (Reported)


Albuterol Sulfate 2.5 Mg/0.5 Ml Vial.neb, 2.5 MG INH Q6H, (Reported)


Alprazolam 0.25 Mg Tablet, 0.25 MG PO BID, (Reported)


Amantadine HCl 100 Mg Capsule, 100 MG PO DAILY, (Reported)


Atorvastatin Calcium 10 Mg Tablet, 10 MG PO HS, (Reported)


Calcium Carbonate 200 Mg Tab.chew, 1,000 MG PO Q6H PRN for INDIGESTION, (Repor

rianna)


Cefdinir 300 Mg Capsule, 300 MG PO BID


   Prescribed by: SRINIVAS SOLORIO on 1/7/19 0940


Furosemide 40 Mg Tablet, 40 MG PO DAILY, (Reported)


Gabapentin 100 Mg Capsule, 200 MG PO HS, (Reported)


Hydrocodone/Acetaminophen 1 Each Tablet, 1 EACH PO Q4H PRN for PAIN-MILD TO 

MODERATE, (Reported)


Magnesium Oxide 200 Mg Tablet, 400 MG PO DAILY, (Reported)


Metoprolol Tartrate 25 Mg Tablet, 25 MG PO BID PRN for hypertension, (Reported)


Miconazole/Lanolin/Skin Cln 1 Each Combo..pkg, 1 EACH TP AS NEEDED, (Reported)


Mirtazapine 15 Mg Tablet, 15 MG PO HS PRN for depresson, (Reported)


Mycophenolate Mofetil 500 Mg Tablet, 1,000 MG PO BID, (Reported)


Ondansetron 4 Mg Tab.rapdis, 4 MG PO Q6H PRN for NAUSEA/VOMITING, (Reported)


Polyethylene Glycol 3350 17 Gm Powd.pack, 17 GM PO DAILY PRN for CONSTIPATION-

1ST LINE, (Reported)


Potassium Chloride 20 Meq Tablet.er, 20 MEQ PO BID, (Reported)


Prednisone 20 Mg Tab, 40 MG PO DAILY


   Prescribed by: EL GUEVARA on 2/22/19 0342


Ranitidine HCl 150 Mg Tablet, 150 MG PO BID, (Reported)


Sennosides/Docusate Sodium 1 Each Tablet, 1 EACH PO BID, (Reported)


Tramadol HCl 50 Mg Tablet, 100 MG PO Q6H PRN for PAIN-MILD TO MODERATE, 

(Reported)


Trazodone HCl 50 Mg Tablet, 25 MG PO HS, (Reported)


[benylin]  , 30 MG PO Q6H PRN for COUGH, (Reported)


[multivitamin-zinc ]  , 1 TAB PO DAILY, (Reported)





Patient Home Medication List


Home Medication List Reviewed:  Yes


 (JAMES JOINER)





Review of Systems


Review of Systems


Constitutional:  chills; No diaphoresis; fever (subjective), malaise


EENTM:  ear pain, eye pain


Respiratory:  cough; No phlegm; short of breath, wheezing


Cardiovascular:  No chest pain, No palpitations


Gastrointestinal:  No abdominal pain, No nausea, No vomiting


Genitourinary:  No discharge, No dysuria


Musculoskeletal:  No back pain, No joint pain (JAMES JOINER)





Past Medical-Social-Family Hx


Patient Social History


Alcohol Use:  Denies Use


Recreational Drug Use:  No


Smoking Status:  Never a Smoker


2nd Hand Smoke Exposure:  No


Recent Hopitalizations:  No


 (JAMES JOINER)





Immunizations Up To Date


Date of Pneumonia Vaccine:  Oct 15, 2018


Date of Influenza Vaccine:  Oct 15, 2018


 (JAMES JOINER)





Seasonal Allergies


Seasonal Allergies:  No


 (JAMES JOINER)





Past Medical History


Surgeries:  Yes


Respiratory:  Yes


COPD


Currently Using CPAP:  No


Currently Using BIPAP:  No


Cardiac:  Yes


Chronic Edema/Swelling, Heart Attack, Hypertension


Neurological:  No


Dementia


Genitourinary:  No


Bladder Infection


Gastrointestinal:  No


Chronic Constipation


Musculoskeletal:  No


Arthritis


Endocrine:  No


HEENT:  No


Cancer:  No


Psychosocial:  No


Integumentary:  No


Blood Disorders:  No


Adverse Reaction/Blood Tranf:  No


 (JAMES JOINER)





Physical Exam





Vital Signs - First Documented








 7/10/19 7/10/19





 05:16 05:35


 


Temp 98.3 


 


Pulse 110 


 


Resp 20 


 


B/P (MAP) 119/63 (81) 


 


Pulse Ox 100 


 


O2 Delivery Room Air 


 


O2 Flow Rate  4.00








 (ZACHERY JACKSON DO)


Capillary Refill :  


 (JAMES JOINER)


Height: 5'3.00"


Weight: 147lbs. 2.0oz. [id number]; 27.0 BMI


Method:Estimated


General Appearance:  WD/WN, mild distress


Eyes:  Bilateral Eye Normal Inspection, Bilateral Eye PERRL, Bilateral Eye EOMI


HEENT:  PERRL/EOMI, normal ENT inspection, pharynx normal (mildly dry)


Neck:  full range of motion, normal inspection


Respiratory:  chest non-tender, no accessory muscle use, respiratory distress 

(chronic), decreased breath sounds, crackles (bilateral bases)


Cardiovascular:  normal peripheral pulses, regular rate, rhythm, no edema


Gastrointestinal:  normal bowel sounds, non tender, soft


Extremities:  normal range of motion, non-tender, normal capillary refill


Neurologic/Psychiatric:  alert, normal mood/affect, oriented x 3, other (hard of

hearing)


Skin:  normal color, warm/dry (JAMES JOINER)





Focused Exam


Lactate Level


7/10/19 05:11: Lactic Acid Level 0.79


 (ZACHERY JACKSON DO)


Lactic Acid Level





Laboratory Tests








Test


 7/10/19


05:11


 


Lactic Acid Level


 0.79 MMOL/L


(0.50-2.00)





 (ZACHERY JACKSON DO)





Progress/Results/Core Measures


Suspected Sepsis


SIRS


Temperature: 


Pulse:  


Respiratory Rate: 


 


Laboratory Tests


7/10/19 05:11: White Blood Count 5.9


Blood Pressure  / 


Mean: 


 





7/10/19 05:11: 








Laboratory Tests


7/10/19 05:11: Platelet Count 161


 (JAMES JOINER)





Results/Orders


Lab Results





Laboratory Tests








Test


 7/10/19


05:11 7/10/19


06:20 7/10/19


07:03 Range/Units


 


 


White Blood Count


 5.9 


 


 


 4.3-11.0


10^3/uL


 


Red Blood Count


 3.83 L


 


 


 4.35-5.85


10^6/uL


 


Hemoglobin 11.7    11.5-16.0  G/DL


 


Hematocrit 37    35-52  %


 


Mean Corpuscular Volume 97    80-99  FL


 


Mean Corpuscular Hemoglobin 31    25-34  PG


 


Mean Corpuscular Hemoglobin


Concent 31 L


 


 


 32-36  G/DL





 


Red Cell Distribution Width 13.0    10.0-14.5  %


 


Platelet Count


 161 


 


 


 130-400


10^3/uL


 


Mean Platelet Volume 10.8 H   7.4-10.4  FL


 


Neutrophils (%) (Auto) 61    42-75  %


 


Lymphocytes (%) (Auto) 26    12-44  %


 


Monocytes (%) (Auto) 8    0-12  %


 


Eosinophils (%) (Auto) 4    0-10  %


 


Basophils (%) (Auto) 1    0-10  %


 


Neutrophils # (Auto) 3.6    1.8-7.8  X 10^3


 


Lymphocytes # (Auto) 1.6    1.0-4.0  X 10^3


 


Monocytes # (Auto) 0.5    0.0-1.0  X 10^3


 


Eosinophils # (Auto)


 0.2 


 


 


 0.0-0.3


10^3/uL


 


Basophils # (Auto)


 0.0 


 


 


 0.0-0.1


10^3/uL


 


Prothrombin Time 15.2 H   12.2-14.7  SEC


 


INR Comment 1.2    0.8-1.4  


 


Activated Partial


Thromboplast Time 29 


 


 


 24-35  SEC





 


Sodium Level 143    135-145  MMOL/L


 


Potassium Level 3.1 L   3.6-5.0  MMOL/L


 


Chloride Level 101      MMOL/L


 


Carbon Dioxide Level 28    21-32  MMOL/L


 


Anion Gap 14    5-14  MMOL/L


 


Blood Urea Nitrogen 7    7-18  MG/DL


 


Creatinine


 0.65 


 


 


 0.60-1.30


MG/DL


 


Estimat Glomerular Filtration


Rate > 60 


 


 


  





 


BUN/Creatinine Ratio 11     


 


Glucose Level 124 H     MG/DL


 


Lactic Acid Level


 0.79 


 


 


 0.50-2.00


MMOL/L


 


Calcium Level 8.6    8.5-10.1  MG/DL


 


Corrected Calcium 8.8    8.5-10.1  MG/DL


 


Total Bilirubin 0.3    0.1-1.0  MG/DL


 


Aspartate Amino Transf


(AST/SGOT) 10 


 


 


 5-34  U/L





 


Alanine Aminotransferase


(ALT/SGPT) 6 


 


 


 0-55  U/L





 


Alkaline Phosphatase 181 H     U/L


 


Pro-B-Type Natriuretic Peptide 825.0 H   <75.0  PG/ML


 


Total Protein 7.1    6.4-8.2  GM/DL


 


Albumin 3.8    3.2-4.5  GM/DL


 


Blood Gas Puncture Site  RT RADIAL    


 


Blood Gas Patient Temperature  98.2    


 


Arterial Blood pH  7.44 H  7.37-7.43  


 


Arterial Blood Partial


Pressure CO2 


 47 H


 


 35-45  MMHG





 


Arterial Blood Partial


Pressure O2 


 127 H


 


 79-93  MMHG





 


Arterial Blood HCO3  32 H  23-27  MMOL/L


 


Arterial Blood Total CO2


 


 33.3 H


 


 21.0-31.0


MMOL/L


 


Arterial Blood Oxygen


Saturation 


 99 


 


   %





 


Arterial Blood Base Excess


 


 6.7 H


 


 -2.5-2.5


MMOL/L


 


Lc Test  OK    


 


Blood Gas Ventilator Setting  NO    


 


Blood Gas Inspired Oxygen  4L    


 


Urine Color   PALE YELLOW   


 


Urine Clarity   CLEAR   


 


Urine pH   6.5  5-9  


 


Urine Specific Gravity   <=1.005  1.016-1.022  


 


Urine Protein   NEGATIVE  NEGATIVE  


 


Urine Glucose (UA)   NEGATIVE  NEGATIVE  


 


Urine Ketones   NEGATIVE  NEGATIVE  


 


Urine Nitrite   NEGATIVE  NEGATIVE  


 


Urine Bilirubin   NEGATIVE  NEGATIVE  


 


Urine Urobilinogen   0.2  NORMAL  MG/DL


 


Urine Leukocyte Esterase   3+ H NEGATIVE  


 


Urine RBC (Auto)   TRACE H NEGATIVE  


 


Urine RBC   NONE   /HPF


 


Urine WBC   10-25 H  /HPF


 


Urine Squamous Epithelial


Cells 


 


 2-5 


  /HPF





 


Urine Crystals   NONE   /LPF


 


Urine Bacteria   TRACE   /HPF


 


Urine Casts   NONE   /LPF


 


Urine Mucus   NONE   /LPF


 


Urine Culture Indicated   YES   





 (ZACHERY JACKSON DO)


My Orders





Orders - ZACHERY JACKSON DO


Ekg Tracing (7/10/19 06:06)


Furosemide  Injection (Lasix  Injection) (7/10/19 06:15)


Arterial Blood Gas (7/10/19 06:12)


Methylprednisolone Sod Succ (Solu-Medrol (7/10/19 06:15)


Albuterol/Ipra Inhalation Soln (Duoneb I (7/10/19 06:15)


Svn Small Volume Nebulizer (7/10/19 06:12)


 (ZACHERY JACKSON DO)


Medications Given in ED





Current Medications








 Medications  Dose


 Ordered  Sig/María


 Route  Start Time


 Stop Time Status Last Admin


Dose Admin


 


 Albuterol/


 Ipratropium  3 ml  ONCE  ONCE


 INH  7/10/19 06:15


 7/10/19 06:16 DC 7/10/19 06:18


3 ML


 


 Cefepime HCl 1000


 mg/Sterile Water  10 ml @ 


 200 mls/hr  ONCE  ONCE


 IV  7/10/19 05:15


 7/10/19 05:20 DC 7/10/19 05:53


200 MLS/HR


 


 Furosemide  40 mg  ONCE  ONCE


 IVP  7/10/19 06:15


 7/10/19 06:16 DC 7/10/19 06:18


40 MG


 


 Methylprednisolone


 Sodium Succinate  62.5 mg  ONCE  ONCE


 IVP  7/10/19 06:15


 7/10/19 06:16 DC 7/10/19 06:18


62.5 MG





 (ZACHERY JACKSON DO)


Vital Signs/I&O











 7/10/19 7/10/19 7/10/19





 05:16 05:35 07:15


 


Temp 98.3  98.6


 


Pulse 110  109


 


Resp 20  20


 


B/P (MAP) 119/63 (81)  120/63 (82)


 


Pulse Ox 100  100


 


O2 Delivery Room Air Nasal Cannula Nasal Cannula


 


O2 Flow Rate  4.00 3.00








 (ZACHERY JACKSON DO)


Vital Signs/I&O


Capillary Refill :  


 (JAMES JOINER)


Progress Note :  


   Time:  05:24


Progress Note


She's not having any further wheezing after the DuoNeb by EMS. We then ordered a

septic workup based on her history of a possible fever and the fact she's has a 

heart rate in 110-115. We'll give her 1500 cc which is more than 20 cc/kg. Chest

x-ray, BNP, labs, UA, cultures and lactate.


 (JAMES JOINER)





Diagnostic Imaging





   Diagonstic Imaging:  Xray


   Plain Films/CT/US/NM/MRI:  chest (1v)


Comments


Course infiltrates right base, left base.


   Reviewed:  Reviewed by Me


 (JAMES JOINER)





Departure


Communication (Admissions)


History and physical exam repeated by this physician. Labs and imaging an EKG 

reviewed. Patient appears to be at her baseline respiratory state. She is not in

any distress. ABG is consistent with chronic respiratory compensated with 

supplemental O2. Chest x-ray is likewise stable. However, given the possibility 

of underlying infection, failure and COPD can not be excluded, the patient will 

be continued on brief course of steroids, antibiotics and diuretics upon return 

to the nursing home.  Patient family member updated with findings and are 

comfortable with disposition and treatment plan.   Patient is to follow up with 

nursing home doctor in 1 week and to return to the ED if worse. 


 (ZACHERY JACKSON DO)





Impression





   Primary Impression:  


   Chronic respiratory failure


   Additional Impression:  


   Interstitial lung disease


Disposition:  01 HOME, SELF-CARE


Condition:  Stable





Departure-Patient Inst.


Decision time for Depature:  08:00


 (ZACHERY JACKSON DO)


Referrals:  


DEMETRIUS CABRALES MD (PCP/Family)


Primary Care Physician


Patient Instructions:  Interstitial Lung Disease, Chronic Bronchitis (DC)


Scripts


Furosemide (Lasix) 20 Mg Tablet


20 MG PO DAILY, #3 TAB


   Prov: ZACHERY JACKSON DO         7/10/19 


Prednisone (Prednisone) 20 Mg Tab


40 MG PO DAILY, #6 TAB 0 Refills


   Prov: ZACHERY JACKSON DO         7/10/19 


Doxycycline Hyclate (Doxycycline Hyclate) 100 Mg Capsule


100 MG PO BID, #14 CAP


   Prov: ZACHERY JACKSON DO         7/10/19











JAMES JOINER                 Jul 10, 2019 05:22


ZACHERY JACKSON DO                   Jul 10, 2019 07:59

## 2019-07-10 NOTE — XMS REPORT
Continuity of Care Document

                             Created on: 07/10/2019



Alexandra Fields

External Reference #: [id number]

: 1945

Sex: Female



Demographics







                          Address                   [address] [place] [address]

 

                          Home Phone                [phone] x

 

                          Preferred Language        Unknown

 

                          Marital Status            Unknown

 

                          Sabianism Affiliation     Unknown

 

                          Race                      Unknown

 

                          Ethnic Group              Unknown





Author







                          Organization              Unknown

 

                          Address                   Unknown

 

                          Phone                     [phone]



              



Allergies

      





             Active              Description              Code              Type              Severity

                Reaction              Onset              Reported/Identified              Relationship

 to Patient                             Clinical Status        

 

                    Yes                 No Allergy Information Available              [id number]           

             Drug Allergy              Unknown              N/A                             2019

                                                             

 

             Yes              NSAIDS              NSAIDS                             Unknown         

             N/A                             2019                                      

 

             Yes              codeine              [id number]              Drug Allergy              

Unknown              N/A                              2019                          

                                                 

 

                Yes              meperidine              [id number]              Drug Allergy         

             Unknown              N/A                             2019                       

                                                 

 

                Yes              rofecoxib              [id number]              Drug Allergy          

             Unknown              N/A                             2019                        

                                                 

 

                Yes              Tetanus Vaccines and Toxoid              [id number]              Drug

 Allergy              Unknown              N/A                              2019    

                                                             



                            



Medications

      



There is no data.                  



Problems

      





             Date Dx Coded              Attending              Type              Code              Diagnosis

                                        Diagnosed By        

 

                2019              REJI MCKEON MD, Ot              E78.00        

                          PURE HYPERCHOLESTEROLEMIA, UNSPECIFIED                       

 

                2019              REJI MCKEON MD, Ot              E87.6         

                          HYPOKALEMIA                        

 

                2019              REJI MCKEON MD, Ot              F03.90        

                          UNSPECIFIED DEMENTIA WITHOUT BEHAVIORAL                        

 

                2019              REJI MCKEON MD, Ot              I10           

                          ESSENTIAL (PRIMARY) HYPERTENSION                       

 

                2019              REJI MCKEON MD, Ot              J18.1         

                          LOBAR PNEUMONIA, UNSPECIFIED ORGANISM                       

 

                2019              REJI MCKEON MD, Ot              J44.0         

                          CHRONIC OBSTRUCTIVE PULMON DISEASE W ACU                       

 

                2019              REJI MCKEON MD, Ot              K59.09        

                          OTHER CONSTIPATION                       

 

                2019              REJI MCKEON MD, Ot              M19.91        

                          PRIMARY OSTEOARTHRITIS, UNSPECIFIED SITE                       

 

                2019              REJI MCKEON MD, Ot              M34.9         

                          SYSTEMIC SCLEROSIS, UNSPECIFIED                       

 

                2019              REJI MCKEON MD, Ot              N30.00        

                          ACUTE CYSTITIS WITHOUT HEMATURIA                       

 

                2019              REJI MCKEON MD              Ot              R09.02        

                          HYPOXEMIA                          

 

                2019              REJI MCKEON MD, Ot              Z99.81        

                          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                       

 

                2019              ISMAEL GALLEGOS DO              Ot              E78.00        

                          PURE HYPERCHOLESTEROLEMIA, UNSPECIFIED                       

 

                2019              ISMAEL GALLEGOS DO              Ot              F03.90        

                          UNSPECIFIED DEMENTIA WITHOUT BEHAVIORAL                        

 

                2019              ISMAEL GALLEGOS DO              Ot              I10           

                          ESSENTIAL (PRIMARY) HYPERTENSION                       

 

                2019              ISMAEL GALLEGOS DO              Ot              J44.9         

                          CHRONIC OBSTRUCTIVE PULMONARY DISEASE, U                       

 

                2019              ISMAEL GALLEGOS DO              Ot              J84.10        

                          PULMONARY FIBROSIS, UNSPECIFIED                       

 

                2019              ISMAEL GALLEGOS DO              Ot              R06.02        

                          SHORTNESS OF BREATH                       

 

                2019              ISMAEL GALLEGOS DO              Ot              Z79.51        

                          LONG TERM (CURRENT) USE OF INHALED STERO                       

 

                2019              ISMAEL GALLEGOS DO              Ot              Z79.52        

                          LONG TERM (CURRENT) USE OF SYSTEMIC STER                       

 

                2019              ISMAEL GALLEGOS DO              Ot              Z87.19        

                          PERSONAL HISTORY OF OTHER DISEASES OF TH                       

 

                2019              ISMAEL GALLEGOS DO              Ot              Z87.448       

                          PERSONAL HISTORY OF OTHER DISEASES OF UR                       

 

                2019              ISMAEL GALLEGOS DO              Ot              Z88.5         

                          ALLERGY STATUS TO NARCOTIC AGENT STATUS                       

 

                2019              ISMAEL GALLEGOS DO              Ot              Z88.6         

                          ALLERGY STATUS TO ANALGESIC AGENT STATUS                       

 

                2019              ISMAEL GALLEGOS DO              Ot              Z88.7         

                          ALLERGY STATUS TO SERUM AND VACCINE STAT                       

 

                2019              ISMAEL GALLEGOS DO              Ot              Z88.8         

                          ALLERGY STATUS TO OTH DRUG/MEDS/BIOL SUB                       

 

                2019              ISMAEL GALLEGOS DO              Ot              Z99.81        

                          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                       

 

                04/10/2019              DEMETRIUS CABRALES MD              Ot              R82.998     

                          OTHER ABNORMAL FINDINGS IN URINE                       

 

                2019              DEMETRIUS CABRALES MD              Ot              R82.998     

                          OTHER ABNORMAL FINDINGS IN URINE                       

 

                2019              DEMETRIUS CABRALES MD              Ot              R82.998     

                          OTHER ABNORMAL FINDINGS IN URINE                       

 

                2019              DEMETRIUS CABRALES MD              Ot              R82.998     

                          OTHER ABNORMAL FINDINGS IN URINE                       

 

                05/15/2019              DEMETRIUS CABRALES MD              Ot              R82.998     

                          OTHER ABNORMAL FINDINGS IN URINE                       

 

                2019              DEMETRIUS CABRALES MD              Ot              R82.998     

                          OTHER ABNORMAL FINDINGS IN URINE                       

 

                06/10/2019              DEMETRIUS CABRALES MD              Ot              R10.84      

                          GENERALIZED ABDOMINAL PAIN                       

 

                06/10/2019              DEMETRIUS CABRALES MD              Ot              Z95.828     

                          PRESENCE OF OTHER VASCULAR IMPLANTS AND                        

 

                2019              DEMETRIUS CABRALES MD              Ot              R10.84      

                          GENERALIZED ABDOMINAL PAIN                       

 

                2019              DEMETRIUS CABRALES MD              Ot              Z95.828     

                          PRESENCE OF OTHER VASCULAR IMPLANTS AND                        

 

                2019              DEMETRIUS CABRALES MD, Ot              R10.84      

                          GENERALIZED ABDOMINAL PAIN                       

 

                2019              DEMETRIUS CABRALES MD              Ot              Z95.828     

                          PRESENCE OF OTHER VASCULAR IMPLANTS AND                        



                                                                                
             



Procedures

      



There is no data.                  



Results

      





                    Test                Result              Range        









                                        Complete blood count (CBC) with automated white blood cell (WBC) differential - 

19 05:33         









                          Blood leukocytes automated count (number/volume)              5.3 10*3/uL         

                                        4.3-11.0        

 

                          Blood erythrocytes automated count (number/volume)              3.79 10*6/uL      

                                        4.35-5.85        

 

                          Venous blood hemoglobin measurement (mass/volume)              11.5 g/dL          

                                        11.5-16.0        

 

                    Blood hematocrit (volume fraction)              37 %                35-52        

 

                    Automated erythrocyte mean corpuscular volume              97 [foz_us]              

80-99        

 

                                        Automated erythrocyte mean corpuscular hemoglobin (mass per erythrocyte)        

                          30 pg                     25-34        

 

                                        Automated erythrocyte mean corpuscular hemoglobin concentration measurement (mass/volume)

                          31 g/dL                   32-36        

 

                    Automated erythrocyte distribution width ratio              13.6 %              10.0-

14.5        

 

                    Automated blood platelet count (count/volume)              147 10*3/uL              

130-400        

 

                          Automated blood platelet mean volume measurement              10.5 [foz_us]       

                                        7.4-10.4        

 

                    Automated blood neutrophils/100 leukocytes              52 %                42-75     

   

 

                    Automated blood lymphocytes/100 leukocytes              30 %                12-44     

   

 

                    Blood monocytes/100 leukocytes              14 %                0-12        

 

                    Automated blood eosinophils/100 leukocytes              3 %                 0-10       

 

 

                    Automated blood basophils/100 leukocytes              0 %                 0-10        

 

                          Blood neutrophils automated count (number/volume)              2.7 10*3           

                                        1.8-7.8        

 

                          Blood lymphocytes automated count (number/volume)              1.6 10*3           

                                        1.0-4.0        

 

                    Blood monocytes automated count (number/volume)              0.7 10*3              0.0-

1.0        

 

                    Automated eosinophil count              0.2 10*3/uL              0.0-0.3        

 

                    Automated blood basophil count (count/volume)              0.0 10*3/uL              

0.0-0.1        









                                        Comprehensive metabolic panel - 19 05:33         









                          Serum or plasma sodium measurement (moles/volume)              140 mmol/L         

                                        135-145        

 

                          Serum or plasma potassium measurement (moles/volume)              3.3 mmol/L      

                                        3.6-5.0        

 

                          Serum or plasma chloride measurement (moles/volume)              105 mmol/L       

                                                

 

                    Carbon dioxide              23 mmol/L              21-32        

 

                          Serum or plasma anion gap determination (moles/volume)              12 mmol/L     

                                        5-14        

 

                          Serum or plasma urea nitrogen measurement (mass/volume)              10 mg/dL     

                                        7-18        

 

                          Serum or plasma creatinine measurement (mass/volume)              0.77 mg/dL      

                                        0.60-1.30        

 

                    Serum or plasma urea nitrogen/creatinine mass ratio              13                  NRG

        

 

                                        Serum or plasma creatinine measurement with calculation of estimated glomerular 

filtration rate              >                         NRG        

 

                          Serum or plasma glucose measurement (mass/volume)              101 mg/dL          

                                                

 

                          Serum or plasma calcium measurement (mass/volume)              9.1 mg/dL          

                                        8.5-10.1        

 

                          Serum or plasma total bilirubin measurement (mass/volume)              0.6 mg/dL  

                                        0.1-1.0        

 

                                        Serum or plasma alkaline phosphatase measurement (enzymatic activity/volume)    

                          156 U/L                           

 

                                        Serum or plasma aspartate aminotransferase measurement (enzymatic activity/volume)

                          13 U/L                    5-34        

 

                                        Serum or plasma alanine aminotransferase measurement (enzymatic activity/volume)

                          8 U/L                     0-55        

 

                          Serum or plasma protein measurement (mass/volume)              6.6 g/dL           

                                        6.4-8.2        

 

                          Serum or plasma albumin measurement (mass/volume)              3.6 g/dL           

                                        3.2-4.5        

 

                    CALCIUM CORRECTED              9.4 mg/dL              8.5-10.1        









                                        Serum or plasma phosphate measurement (mass/volume) - 19 05:33         









                          Serum or plasma phosphate measurement (mass/volume)              3.9 mg/dL        

                                        2.3-4.7        









                                        Magnesium - 19 05:33         









                    Magnesium              1.9 mg/dL              1.8-2.4        









                                        Serum or plasma lithium measurement (moles/volume) - 19 05:33         









                    BNP level              24.9 pg/mL              <100.0        









                                        Bacterial blood culture - 19 06:59         









                    Bacterial blood culture              NG                  NRG        









                                        Blood lactic acid measurement (moles/volume) - 19 07:05         









                    Blood lactic acid measurement (moles/volume)              1.01 mmol/L              0.50-

2.00        









                                        Bacterial blood culture - 19 07:05         









                    Bacterial blood culture              NG                  NRG        









                                        Complete blood count (CBC) with automated white blood cell (WBC) differential - 

19 06:00         









                          Blood leukocytes automated count (number/volume)              5.0 10*3/uL         

                                        4.3-11.0        

 

                          Blood erythrocytes automated count (number/volume)              3.63 10*6/uL      

                                        4.35-5.85        

 

                          Venous blood hemoglobin measurement (mass/volume)              10.8 g/dL          

                                        11.5-16.0        

 

                    Blood hematocrit (volume fraction)              35 %                35-52        

 

                    Automated erythrocyte mean corpuscular volume              97 [foz_us]              

80-99        

 

                                        Automated erythrocyte mean corpuscular hemoglobin (mass per erythrocyte)        

                          30 pg                     25-34        

 

                                        Automated erythrocyte mean corpuscular hemoglobin concentration measurement (mass/volume)

                          31 g/dL                   32-36        

 

                    Automated erythrocyte distribution width ratio              13.5 %              10.0-

14.5        

 

                    Automated blood platelet count (count/volume)              159 10*3/uL              

130-400        

 

                          Automated blood platelet mean volume measurement              10.4 [foz_us]       

                                        7.4-10.4        

 

                    Automated blood neutrophils/100 leukocytes              53 %                42-75     

   

 

                    Automated blood lymphocytes/100 leukocytes              31 %                12-44     

   

 

                    Blood monocytes/100 leukocytes              12 %                0-12        

 

                    Automated blood eosinophils/100 leukocytes              4 %                 0-10       

 

 

                    Automated blood basophils/100 leukocytes              0 %                 0-10        

 

                          Blood neutrophils automated count (number/volume)              2.6 10*3           

                                        1.8-7.8        

 

                          Blood lymphocytes automated count (number/volume)              1.6 10*3           

                                        1.0-4.0        

 

                    Blood monocytes automated count (number/volume)              0.6 10*3              0.0-

1.0        

 

                    Automated eosinophil count              0.2 10*3/uL              0.0-0.3        

 

                    Automated blood basophil count (count/volume)              0.0 10*3/uL              

0.0-0.1        









                                        Comprehensive metabolic panel - 19 06:00         









                          Serum or plasma sodium measurement (moles/volume)              139 mmol/L         

                                        135-145        

 

                          Serum or plasma potassium measurement (moles/volume)              4.0 mmol/L      

                                        3.6-5.0        

 

                          Serum or plasma chloride measurement (moles/volume)              106 mmol/L       

                                                

 

                    Carbon dioxide              24 mmol/L              21-32        

 

                          Serum or plasma anion gap determination (moles/volume)              9 mmol/L      

                                        5-14        

 

                          Serum or plasma urea nitrogen measurement (mass/volume)              10 mg/dL     

                                        7-18        

 

                          Serum or plasma creatinine measurement (mass/volume)              0.70 mg/dL      

                                        0.60-1.30        

 

                    Serum or plasma urea nitrogen/creatinine mass ratio              14                  NRG

        

 

                                        Serum or plasma creatinine measurement with calculation of estimated glomerular 

filtration rate              >                         NRG        

 

                          Serum or plasma glucose measurement (mass/volume)              97 mg/dL           

                                                

 

                          Serum or plasma calcium measurement (mass/volume)              9.0 mg/dL          

                                        8.5-10.1        

 

                          Serum or plasma total bilirubin measurement (mass/volume)              0.5 mg/dL  

                                        0.1-1.0        

 

                                        Serum or plasma alkaline phosphatase measurement (enzymatic activity/volume)    

                          146 U/L                           

 

                                        Serum or plasma aspartate aminotransferase measurement (enzymatic activity/volume)

                          12 U/L                    5-34        

 

                                        Serum or plasma alanine aminotransferase measurement (enzymatic activity/volume)

                          6 U/L                     0-55        

 

                          Serum or plasma protein measurement (mass/volume)              6.9 g/dL           

                                        6.4-8.2        

 

                          Serum or plasma albumin measurement (mass/volume)              3.5 g/dL           

                                        3.2-4.5        

 

                    CALCIUM CORRECTED              9.4 mg/dL              8.5-10.1        









                                        LIPID PANEL - 19 11:05         









                    CHOLESTEROL, TOTAL              154 mg/dL              <200        

 

                    HDL CHOLESTEROL              40 mg/dL              >50        

 

                    TRIGLYCERIDES              139 mg/dL              <150        

 

                    LDL-CHOLESTEROL              90 mg/dL (calc)              NRG        

 

                    CHOL/HDLC RATIO              3.9 (calc)              <5.0        

 

                    NON HDL CHOLESTEROL              114 mg/dL (calc)              <130        









                                        Complete urinalysis with reflex to culture - 19 17:30         









                    Urine color determination              YELLOW               NRG        

 

                    Urine clarity determination              CLOUDY               NRG        

 

                    Urine pH measurement by test strip              6.5                 5-9        

 

                    Specific gravity of urine by test strip              1.020               1.016-1.022

        

 

                          Urine protein assay by test strip, semi-quantitative              TRACE           

                                        NEGATIVE        

 

                    Urine glucose detection by automated test strip              NEGATIVE               

NEGATIVE        

 

                          Erythrocytes detection in urine sediment by light microscopy              NEGATIVE

                                        NEGATIVE        

 

                    Urine ketones detection by automated test strip              NEGATIVE               

NEGATIVE        

 

                    Urine nitrite detection by test strip              POSITIVE               NEGATIVE  

      

 

                    Urine total bilirubin detection by test strip              NEGATIVE               NEGATIVE

        

 

                          Urine urobilinogen measurement by automated test strip (mass/volume)              

0.2 mg/dL                               NORMAL        

 

                    Urine leukocyte esterase detection by dipstick              3+                  NEGATIVE

        

 

                                        Automated urine sediment erythrocyte count by microscopy (number/high power field)

                          NONE                      NRG        

 

                                        Automated urine sediment leukocyte count by microscopy (number/high power field)

                          > [HPF]                   NRG        

 

                          Bacteria detection in urine sediment by light microscopy              LARGE       

                                        NRG        

 

                          Crystals detection in urine sediment by light microscopy              NONE        

                                        NRG        

 

                          Casts detection in urine sediment by light microscopy              NONE           

                                        NRG        

 

                          Mucus detection in urine sediment by light microscopy              NEGATIVE       

                                        NRG        

 

                    Complete urinalysis with reflex to culture              YES                 NRG       

 









                                        Bacterial urine culture - 19 17:30         









                    Bacterial urine culture              [id number]               NRG        

 

                    COLONY COUNT              >100,000/ML               NRG        

 

                    FTX;REPORTABLE              PREDOMINANT               NRG        

 

                    FREE TEXT ENTRY 2              SUSCEPTIBILITY REPORTED 19 10:05               NRG

        









                                        RML Sensitivity Panel - 19 17:30         









                          Gentamicin susceptibility test by minimum inhibitory concentration              > 

                                        NRG        

 

                                        Trimethoprim/sulfamethoxazole susceptibility test by minimum inhibitoryconcentration

                          >                         NRG        

 

                          Levofloxacin susceptibility test by minimum inhibitory concentration              

>                                       NRG        

 

                          Ampicillin susceptibility test by minimum inhibitory concentration              > 

                                        NRG        

 

                          Cefazolin susceptibility test by minimum inhibitory concentration              16 

                                        NRG        

 

                          Ceftriaxone susceptibility test by minimum inhibitory concentration              <=

                                        NRG        

 

                                        Ciprofloxacin susceptibility test by minimum inhibitory concentration           

                          >                         NRG        

 

                          Meropenem susceptibility test by minimum inhibitory concentration              <= 

                                        NRG        

 

                                        Nitrofurantoin susceptibility test by minimum inhibitory concentration          

                          <=                        NRG        

 

                    Amoxicillin and clavulanate potassium susc SUMMER              =                   NRG      

  



                                          



Encounters

      





                ACCT No.              Visit Date/Time              Discharge              Status      

                Pt. Type              Provider              Facility              Loc./Unit      

                                        Complaint        

 

                [id number]              2019 14:00:00              2019 23:59:59              

[facility]              [facility]                              [facility]                                 

 

             [id number]              2019 17:45:00                                            Document

 Registration                                                                       

 

                    [id number]              2019 08:21:00              2019 23:59:59      

                CLS              Outpatient              DEMETRIUS CABRALES MD              [facility]              RAD FS                    R10.84         

 

                    [id number]              2019 17:43:00              2019 23:59:59      

                CLS              Outpatient              DEMETRIUS CABRALES MD              [facility]              LAB FS                    ABD PAIN; FOUL URINE        

 

                    [id number]              2019 03:14:00              2019 04:28:00      

                DIS              Emergency              ISMAEL GALLEGOS DO              [facility]              ER FS                     SOB        

 

                    [id number]              2019 19:20:00              2019 12:50:00      

                DIS              Inpatient              MIKE ABAD, REJI MANCILLA              [facility]              4TH                       P[place]UMONIA

## 2019-07-10 NOTE — DIAGNOSTIC IMAGING REPORT
INDICATION: Short of breath



Portable chest shows cardiomegaly with mild pulmonary venous

distention. There is basilar atelectasis and infiltrate similar

to the 02/22/2019 study. This probably represents chronic

interstitial lung disease. There is no effusion or pneumothorax.



IMPRESSION: No acute abnormality is seen. There are changes

consistent with interstitial lung disease. Stable chest.



Dictated by: 



  Dictated on workstation # [id number]

## 2019-07-10 NOTE — NUR
Notified by Irina Anaya at [facility] that [facility] coming 
up to transfer pt.

## 2019-07-10 NOTE — NUR
Call to [facility] and gave report to Esau HURTADO. Pt transportation is a 
delemma as  van in route to [place]. Requested their staff to call [facility] 
regarding transportation back.

## 2019-07-10 NOTE — NUR
[facility] ML here with a W/C van, patient's  and Home O2 and her tennis 
shoes. Assisted with a patient transfer to  with  staff. See discharge 
summary. Call to NH prior to patient transport of numerous call lights and 
requests. Pt wanting coffee and breakfast.

## 2019-07-10 NOTE — NUR
Rec'd call from [facility] Director explaining they are not transporting a 
non-bed confinement patient to NH without billing the NH. Questioned our staff 
if on bed confinement. The NH is trying to work out arrangements.

## 2019-09-23 ENCOUNTER — HOSPITAL ENCOUNTER (OUTPATIENT)
Dept: HOSPITAL 75 - RAD FS | Age: 74
End: 2019-09-23
Attending: NURSE PRACTITIONER
Payer: MEDICARE

## 2019-09-23 DIAGNOSIS — M25.562: ICD-10-CM

## 2019-09-23 DIAGNOSIS — M16.12: Primary | ICD-10-CM

## 2019-09-23 DIAGNOSIS — Z98.890: ICD-10-CM

## 2019-09-23 PROCEDURE — 73562 X-RAY EXAM OF KNEE 3: CPT

## 2019-09-23 PROCEDURE — 73502 X-RAY EXAM HIP UNI 2-3 VIEWS: CPT

## 2019-09-23 NOTE — DIAGNOSTIC IMAGING REPORT
INDICATION: Left knee pain.



TIME OF EXAM: 2:04 p.m.



Multiple views of the left knee were obtained.



FINDINGS: There is generalized demineralization noted.

Intramedullary jimena transfixes the left femur. There are bony

excrescences arising from the medial aspect of the mid and distal

third of the femur. Mixed lucency within the femoral shaft at

this location is seen as well. Changes are also noted along the

lateral cortex of the mid and distal femur but to a lesser

degree. No pathologic fracture is identified. Alignment of the

knee is normal. Visualized hardware appears intact.



IMPRESSION: Postsurgical changes in left femur. There is a

chronic appearing process involving the mid and distal shaft of

the left femur with mixed lucency as well as bony excrescences

arising from the medial aspect of the mid and distal left femur.

Cortex does not appear to be intact. This may represent a chronic

process. Clinical correlation to prior injury/infection is

recommended. Prior imaging would be helpful as well as we have no

prior radiographs available. Attempt should be made to obtain

patient's outside imaging for comparison purposes.



Dictated by: 



  Dictated on workstation # [id number]

## 2019-09-23 NOTE — DIAGNOSTIC IMAGING REPORT
INDICATION: Left hip pain.



TECHNIQUE: AP and oblique views of the left hip are obtained.



FINDINGS: There is osteopenia. There is intramedullary jimena in

place in the left femoral shaft. There appears to be a chronic

healed fracture deformity of the left femoral shaft. There is no

acute bony abnormality seen. There is moderate joint space

narrowing of the left hip joint.



IMPRESSION: Postop changes with old healed fracture deformity of

left femoral shaft with intramedullary jimena in place. No

acute-appearing bony abnormality. There is moderate degenerative

change of the left hip joint.



Dictated by: 



  Dictated on workstation # [id number]

## 2019-11-14 ENCOUNTER — HOSPITAL ENCOUNTER (OUTPATIENT)
Dept: HOSPITAL 75 - RAD FS | Age: 74
End: 2019-11-14
Attending: FAMILY MEDICINE
Payer: MEDICARE

## 2019-11-14 DIAGNOSIS — L03.211: Primary | ICD-10-CM

## 2019-11-14 PROCEDURE — 70486 CT MAXILLOFACIAL W/O DYE: CPT

## 2019-11-14 NOTE — DIAGNOSTIC IMAGING REPORT
PROCEDURE: CT maxillofacial without contrast.



TECHNIQUE: Multiple contiguous axial images were obtained through

the facial bones without the use of intravenous contrast. Auto

Exposure Controls were utilized during the CT exam to meet ALARA

standards for radiation dose reduction. 



INDICATION:  Facial cellulitis. 



COMPARISON: None.



FINDINGS: No soft tissue mass or fluid collection is identified.

The paranasal sinuses are clear. The ostiomeatal units and

frontal recesses are patent. The mastoids and middle ears are

clear. No fractures. Mild leftward bowing of the nasal septum.

Advanced degenerative changes in the temporomandibular joints.



IMPRESSION: No acute maxillofacial CT findings. Specifically, no

soft tissue mass or fluid collection is identified. No

substantial inflammatory change on this noncontrast exam.



Dictated by: 



  Dictated on workstation # [id number]